# Patient Record
Sex: FEMALE | Race: WHITE | NOT HISPANIC OR LATINO | Employment: FULL TIME
[De-identification: names, ages, dates, MRNs, and addresses within clinical notes are randomized per-mention and may not be internally consistent; named-entity substitution may affect disease eponyms.]

---

## 2017-08-05 ENCOUNTER — HEALTH MAINTENANCE LETTER (OUTPATIENT)
Age: 52
End: 2017-08-05

## 2018-08-12 ENCOUNTER — HEALTH MAINTENANCE LETTER (OUTPATIENT)
Age: 53
End: 2018-08-12

## 2019-12-23 ENCOUNTER — TRANSFERRED RECORDS (OUTPATIENT)
Dept: HEALTH INFORMATION MANAGEMENT | Facility: CLINIC | Age: 54
End: 2019-12-23

## 2019-12-30 ENCOUNTER — MEDICAL CORRESPONDENCE (OUTPATIENT)
Dept: HEALTH INFORMATION MANAGEMENT | Facility: CLINIC | Age: 54
End: 2019-12-30

## 2023-02-14 ENCOUNTER — TRANSCRIBE ORDERS (OUTPATIENT)
Dept: OTHER | Age: 58
End: 2023-02-14

## 2023-02-14 DIAGNOSIS — E34.8 PINEAL GLAND CYST: Primary | ICD-10-CM

## 2023-06-21 NOTE — TELEPHONE ENCOUNTER
Action 6/21/23 MV 1.13p   Action Taken Imaging request faxed to [facility]     Action 7/11/23 MV 1.09pm   Action Taken Images resolved in PACS       RECORDS RECEIVED FROM: external   REASON FOR VISIT: Pineal gland cyst   Date of Appt: 8/23/23   NOTES (FOR ALL VISITS) STATUS DETAILS   OFFICE NOTE from referring provider Care Everywhere Dr Ana Piedra @ [facility]:  2/8/23 telephone encounter   OFFICE NOTE from other specialist Care Everywhere Dr Bruce Restrepo @ [facility]:  1/18/23 11/8/22    Dr Eldon Delgado @ [facility]:  8/19/22    Dr Balta Bruce @ [facility]:  1/5/22    Dr Trenton Seals @ [facility]:  12/23/21  10/27/21  4/15/21  4/6/21   DISCHARGE SUMMARY from hospital Care Everywhere [facility]:  10/28/22-10/30/22   EMG Care Everywhere [facility]:  12/23/21 4/8/21   MEDICATION LIST Care Everywhere    IMAGING  (FOR ALL VISITS)     PET PACS [facility]:  PET CT Brain 12/30/21   MRI (HEAD, NECK, SPINE) PACS [facility]:  MRI Cervical Spine 12/23/21  MRI Brain 12/23/21  MRI Lumbar Spine 12/23/21  MRI Lumbar Spine 4/8/21  MRI Thoracic Spine 4/8/21  MRI Brain 4/7/21  MRI Cervical Spine 4/7/21

## 2023-08-23 ENCOUNTER — OFFICE VISIT (OUTPATIENT)
Dept: NEUROLOGY | Facility: CLINIC | Age: 58
End: 2023-08-23
Attending: FAMILY MEDICINE
Payer: COMMERCIAL

## 2023-08-23 ENCOUNTER — PRE VISIT (OUTPATIENT)
Dept: NEUROLOGY | Facility: CLINIC | Age: 58
End: 2023-08-23

## 2023-08-23 VITALS
OXYGEN SATURATION: 99 % | HEART RATE: 70 BPM | SYSTOLIC BLOOD PRESSURE: 124 MMHG | DIASTOLIC BLOOD PRESSURE: 84 MMHG | RESPIRATION RATE: 16 BRPM

## 2023-08-23 DIAGNOSIS — E34.8 PINEAL GLAND CYST: ICD-10-CM

## 2023-08-23 PROBLEM — M54.12 CERVICAL RADICULOPATHY: Status: ACTIVE | Noted: 2021-05-04

## 2023-08-23 PROCEDURE — 99204 OFFICE O/P NEW MOD 45 MIN: CPT | Mod: GC | Performed by: PSYCHIATRY & NEUROLOGY

## 2023-08-23 RX ORDER — CALCIUM CARBONATE 500(1250)
TABLET ORAL
COMMUNITY
End: 2023-08-25

## 2023-08-23 ASSESSMENT — PAIN SCALES - GENERAL: PAINLEVEL: NO PAIN (0)

## 2023-08-23 NOTE — PROGRESS NOTES
Chief Complaint: Left sided spasticity    History of Present Illness:    Ellen Jones is a 57 year old woman who we are seeing at the request of herself for evaluation of paresthesias and stiffness.     She originally became symptomatic with left foot paresthesias in the spring 2020.  She had gradual increased intensity of the paresthesias for about 1 year, and started to affect her left hand in the spring of 2021.  She started to notice mild stiffness of her left foot and hand at that time as well, most notable during walking and fine motor tasks.  She initially was evaluated at [place] in 2021 for these paresthesias and she got an MRI of brain and C-spine, most notably showing a benign gland cyst at that time.  Since this time, she  describes progression of symptoms that include entire left leg paresthesias and worsened stiffness of the left foot, left leg, left hand, and left upper arm in the last year.  Also in the last year she describes right leg stiffness that did not stand but rather had onset of lower leg.  She now has cramping in the left foot and left hand, usually induced by tasks such as walking or typing.  Walking has become significantly difficult, most notably with stiffness of her legs, but also due to the instability while walking.  She describes mild numbness of the left foot and hand when attempting to grasp objects.  She also profound changes to bradykinesia, most notably in the left hemibody.  She denies changes to chewing, swallowing, dysarthria,    Otherwise, denies changes to chewing, swallowing, shortness of breath, orthopnea, diplopia, dysarthria, speech changes.  She does have mild decrease of voice volume over years.  She has mild left eye photosensitivity and decreased acuity (described as blurriness) in the last 2 to 3 months.  She has noticed worsening of fine motor tasks, especially ones that include dexterity such as typing, buttons, and opening jars.  She has stopped driving  because she has poor reaction time and feels uncoordinated and stiff in the left arm and cannot turn very well.  She is much slower with all of her ADLs, and occasionally needs help with them in the last few months.  She denies lion myoclonus, although does describe occasional jerkiness of her left hand.  She denies inadvertent movements of her left hand or foot.    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  She did not go through with this because she was concerned that the pineal gland cyst may be contributing to her symptoms.  FDG PET was also obtained at [place], and do not definitively show any asymmetry or lack of uptake in the cortices.  She found a provider with specific interest in pineal gland cysts in [place], and recently went down there to have this cyst drained, which was successfully performed, but unfortunately she did not get any resolution of her symptoms.  She has since had MRI of the brain and C-spine performed locally through a separate radiology department, just performed in April, but these images are on a CD and will be uploaded to PACS shortly, not currently available for review.    Prior pertinent radiology work-up:  Most recent imaging available in our system is December 2021, which was performed at [place] and include MRI brain, cervical spine, and PET scan of the brain.  MRI brain only notable for benign pineal gland cyst, otherwise no acute findings to explain symptoms in the brain or cervical spine.    Prior electrophysiologic work-up:  Nerve conduction studies/EMG: Nerve conduction studies and EMG performed at [place] in 2021 which  showed chronic left L5 radiculopathy without any active denervation and unlikely contributing to symptoms.    Past Medical History:   Benign pineal gland cyst s/p drainage in [place]    Past Surgical  History:  Past Surgical History:   Procedure Laterality Date    [facility] NONSPECIFIC PROCEDURE  July 2003    hysteroscopy with resection of submucous myoma, Dr. Hugo     Family history:    There is no known family history of hereditary neuropathies or other neuromuscular disorders.    Social History:    She denies tobacco, alcohol, or illicit drug use.     Medical Allergies:  No Known Allergies     Current Medications:    Current Outpatient Medications   Medication    MULTIVITAMIN TABS   OR    calcium carbonate (OS-SCOOBY) 500 MG tablet    CALCIUM PO    norgestim-eth estrad triphasic (ORTHO TRI-CYCLEN LO) 0.18/0.215/0.25 MG-25 MCG TABS     No current facility-administered medications for this visit.     Review of Systems: A complete review of systems was obtained and was negative except for what was noted above.    Physical examination:    /84   Pulse 70   Resp 16   LMP 06/03/2015 (Exact Date)   SpO2 99%      General Appearance: NAD    Skin: There are no rashes or other skin lesions.    Musculoskeletal:  There is no scoliosis, lordosis, kyphosis, pes cavus, or hammertoes.    Neurologic examination:    Mental status:  Patient is alert, attentive.  Language is coherent and fluent without aphasia.  Memory, comprehension and ability to follow commands were intact.       Cranial nerves:   Pupils were round and reacted to light.  Extraocular movements were full. There was no face, jaw, palate or tongue weakness or atrophy. Hearing was grossly intact.  Shoulder shrug some lag in the left side.  Voice was hypophonic, no dysarthria.  Decreased blink rate.     Motor exam: The is increased tone and cogwheeling in the left arm and leg. No atrophy or fasciculations.   Manual muscle testing revealed the following MRC grade muscle power:   Right Left   Neck flexion 5    Neck extension: 5    Shoulder abduction:  5 5   Elbow extension: 5 5   Elbow flexion:  5 5   Wrist flexion:  5 5   Wrist flexion: 5 5   Finger extension:  5 5    FDI 5 5   Hip extension 5 5   Knee flexion 5 5   Knee extension 5 5   Dorsiflexion 5 5   Plantar flexion 5 5     Complex motor skills: Finger-nose-finger intact without ataxia, but did show slowing of the left arm during testing.  Heel-to-shin slow bilaterally, but accurate slide.  Rolling of the hand showed satelliting of the right arm around the left.  But alternating movements were slow on the left finger tapping and left toe tapping.    Sensory exam revealed normal vibratory perception in the toes bilaterally. Proprioception was intact. Pinprick hyperesthesia of the right foot, and left side from toes up to mid shin, otherwise normal in the fingertips.  Light touch was normal.  Two-point discrimination was abnormal in the left hand.  Romberg sign was absent.       Gait: Able to rise from chair unassisted.  Gait showed significant rigidity of the left hemibody, minimal bend in the left knee, almost absent arm swing in the left arm, short stride, 5 steps for turn.  Retropulsion test was positive.     Deep tendon reflexes:   Right Left   Triceps 2 2   Biceps 3 3   Brachioradialis 3 3   Knee jerk 3 3   Ankle jerk 2 2   Plantar responses were flexor bilaterally.  There was some spread biceps and brachioradialis to finger flexion.  Luigi negative.  Knee jerk and abductors about the knee showed 3-4 beats of clonus in the knees.  No clonus about the ankle.     Assessment:    Ellen Jones is a 57 year old woman with progressive asymmetric stiffness and spasticity. She is Parkinsonian on examination. As part of her [place] work up an incidental benign pineal gland cyst was found. This was drained in [place], which has not changed.  She has marked asymmetric bradykinesia, rigidity, and subjective paresthesias most prominent in the left hemibody, with some involvement of the right leg.  Due to possible agraphesthesia on exam, this may be attributable to cortical sensory dysfunction.  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  Based on relatively recent nerve conduction studies performed at [place] during which time she was having the notable paresthesias, only showed chronic left L5 radiculopathy without any active denervation changes and unlikely contributing to her syndrome.  She does not have any active neuromuscular problem.  Strongly recommend following up with movement disorder specialist. We will help facilitate this referral. Appreciate their expertise for further work up and management.     Plan:      - Referral to movement disorder team for further work up and management   - No further neuromuscular work up indicated    Patient seen and discussed with attending Dr. Peidra. His addendum follows.     Eliceo Enrique MD  Neuromuscular Medicine Fellow, PGY-5  [facility]  ---    ADDENDUM:  I personally interviewed and examined the patient. I agree with the history, physical, assessment, and plan as documented above. Changes to the physical examination, assessment and plan have been incorporated into the note by myself, as to make it a single cohesive document.    Kermit Piedra MD

## 2023-08-23 NOTE — LETTER
8/23/2023       RE: Ellen Jones  [address] [address]     Dear Colleague,    Thank you for referring your patient, Ellen Jones, to the [facility] [place] at [facility]. Please see a copy of my visit note below.    Chief Complaint: Left sided spasticity    History of Present Illness:    Ellen Jones is a 57 year old woman who we are seeing at the request of herself for evaluation of paresthesias and stiffness.     She originally became symptomatic with left foot paresthesias in the spring 2020.  She had gradual increased intensity of the paresthesias for about 1 year, and started to affect her left hand in the spring of 2021.  She started to notice mild stiffness of her left foot and hand at that time as well, most notable during walking and fine motor tasks.  She initially was evaluated at [place] in 2021 for these paresthesias and she got an MRI of brain and C-spine, most notably showing a benign gland cyst at that time.  Since this time, she  describes progression of symptoms that include entire left leg paresthesias and worsened stiffness of the left foot, left leg, left hand, and left upper arm in the last year.  Also in the last year she describes right leg stiffness that did not stand but rather had onset of lower leg.  She now has cramping in the left foot and left hand, usually induced by tasks such as walking or typing.  Walking has become significantly difficult, most notably with stiffness of her legs, but also due to the instability while walking.  She describes mild numbness of the left foot and hand when attempting to grasp objects.  She also profound changes to bradykinesia, most notably in the left hemibody.  She denies changes to chewing, swallowing, dysarthria,    Otherwise, denies changes to chewing, swallowing, shortness of breath, orthopnea, diplopia, dysarthria, speech changes.  She does  have mild decrease of voice volume over years.  She has mild left eye photosensitivity and decreased acuity (described as blurriness) in the last 2 to 3 months.  She has noticed worsening of fine motor tasks, especially ones that include dexterity such as typing, buttons, and opening jars.  She has stopped driving because she has poor reaction time and feels uncoordinated and stiff in the left arm and cannot turn very well.  She is much slower with all of her ADLs, and occasionally needs help with them in the last few months.  She denies lion myoclonus, although does describe occasional jerkiness of her left hand.  She denies inadvertent movements of her left hand or foot.    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  She did not go through with this because she was concerned that the pineal gland cyst may be contributing to her symptoms.  FDG PET was also obtained at [place], and do not definitively show any asymmetry or lack of uptake in the cortices.  She found a provider with specific interest in pineal gland cysts in [place], and recently went down there to have this cyst drained, which was successfully performed, but unfortunately she did not get any resolution of her symptoms.  She has since had MRI of the brain and C-spine performed locally through a separate radiology department, just performed in April, but these images are on a CD and will be uploaded to PACS shortly, not currently available for review.    Prior pertinent radiology work-up:  Most recent imaging available in our system is December 2021, which was performed at [place] and include MRI brain, cervical spine, and PET scan of the brain.  MRI brain only notable for benign pineal gland cyst, otherwise no acute findings to explain symptoms in the brain or cervical spine.    Prior electrophysiologic  work-up:  Nerve conduction studies/EMG: Nerve conduction studies and EMG performed at [place] in 2021 which  showed chronic left L5 radiculopathy without any active denervation and unlikely contributing to symptoms.    Past Medical History:   Benign pineal gland cyst s/p drainage in [place]    Past Surgical History:  Past Surgical History:   Procedure Laterality Date    [facility] NONSPECIFIC PROCEDURE  July 2003    hysteroscopy with resection of submucous myoma, Dr. Hugo     Family history:    There is no known family history of hereditary neuropathies or other neuromuscular disorders.    Social History:    She denies tobacco, alcohol, or illicit drug use.     Medical Allergies:  No Known Allergies     Current Medications:    Current Outpatient Medications   Medication    MULTIVITAMIN TABS   OR    calcium carbonate (OS-SCOOBY) 500 MG tablet    CALCIUM PO    norgestim-eth estrad triphasic (ORTHO TRI-CYCLEN LO) 0.18/0.215/0.25 MG-25 MCG TABS     No current facility-administered medications for this visit.     Review of Systems: A complete review of systems was obtained and was negative except for what was noted above.    Physical examination:    /84   Pulse 70   Resp 16   LMP 06/03/2015 (Exact Date)   SpO2 99%      General Appearance: NAD    Skin: There are no rashes or other skin lesions.    Musculoskeletal:  There is no scoliosis, lordosis, kyphosis, pes cavus, or hammertoes.    Neurologic examination:    Mental status:  Patient is alert, attentive.  Language is coherent and fluent without aphasia.  Memory, comprehension and ability to follow commands were intact.       Cranial nerves:   Pupils were round and reacted to light.  Extraocular movements were full. There was no face, jaw, palate or tongue weakness or atrophy. Hearing was grossly intact.  Shoulder shrug some lag in the left side.  Voice was hypophonic, no dysarthria.  Decreased blink rate.     Motor exam: The is increased tone and cogwheeling in the  left arm and leg. No atrophy or fasciculations.   Manual muscle testing revealed the following MRC grade muscle power:   Right Left   Neck flexion 5    Neck extension: 5    Shoulder abduction:  5 5   Elbow extension: 5 5   Elbow flexion:  5 5   Wrist flexion:  5 5   Wrist flexion: 5 5   Finger extension:  5 5   FDI 5 5   Hip extension 5 5   Knee flexion 5 5   Knee extension 5 5   Dorsiflexion 5 5   Plantar flexion 5 5     Complex motor skills: Finger-nose-finger intact without ataxia, but did show slowing of the left arm during testing.  Heel-to-shin slow bilaterally, but accurate slide.  Rolling of the hand showed satelliting of the right arm around the left.  But alternating movements were slow on the left finger tapping and left toe tapping.    Sensory exam revealed normal vibratory perception in the toes bilaterally. Proprioception was intact. Pinprick hyperesthesia of the right foot, and left side from toes up to mid shin, otherwise normal in the fingertips.  Light touch was normal.  Two-point discrimination was abnormal in the left hand.  Romberg sign was absent.       Gait: Able to rise from chair unassisted.  Gait showed significant rigidity of the left hemibody, minimal bend in the left knee, almost absent arm swing in the left arm, short stride, 5 steps for turn.  Retropulsion test was positive.     Deep tendon reflexes:   Right Left   Triceps 2 2   Biceps 3 3   Brachioradialis 3 3   Knee jerk 3 3   Ankle jerk 2 2   Plantar responses were flexor bilaterally.  There was some spread biceps and brachioradialis to finger flexion.  Luigi negative.  Knee jerk and abductors about the knee showed 3-4 beats of clonus in the knees.  No clonus about the ankle.     Assessment:    Ellen Jones is a 57 year old woman with progressive asymmetric stiffness and spasticity. She is Parkinsonian on examination. As part of her [place] work up an incidental benign pineal gland cyst was found. This was drained in [facility]  [place], which has not changed.  She has marked asymmetric bradykinesia, rigidity, and subjective paresthesias most prominent in the left hemibody, with some involvement of the right leg.  Due to possible agraphesthesia on exam, this may be attributable to cortical sensory dysfunction.  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  Based on relatively recent nerve conduction studies performed at [place] during which time she was having the notable paresthesias, only showed chronic left L5 radiculopathy without any active denervation changes and unlikely contributing to her syndrome.  She does not have any active neuromuscular problem.  Strongly recommend following up with movement disorder specialist. We will help facilitate this referral. Appreciate their expertise for further work up and management.     Plan:      - Referral to movement disorder team for further work up and management   - No further neuromuscular work up indicated    Patient seen and discussed with attending Dr. Piedra. His addendum follows.     Eliceo Enrique MD  Neuromuscular Medicine Fellow, PGY-5  [facility]  ---    ADDENDUM:  I personally interviewed and examined the patient. I agree with the history, physical, assessment, and plan as documented above. Changes to the physical examination, assessment and plan have been incorporated into the note by myself, as to make it a single cohesive document.           Again, thank you for allowing me to participate in the care of your patient.      Sincerely,    Kermit Piedra MD

## 2023-08-24 ENCOUNTER — TELEPHONE (OUTPATIENT)
Dept: NEUROLOGY | Facility: CLINIC | Age: 58
End: 2023-08-24
Payer: COMMERCIAL

## 2023-08-24 NOTE — TELEPHONE ENCOUNTER
Return patient call to rescheduled at different location per patient request.  Patient stated she will keep her [place] location appointment with Dr. Blevins.      Ella Graham on 8/24/2023 at 8:35 AM

## 2023-08-25 ENCOUNTER — TELEPHONE (OUTPATIENT)
Dept: NEUROLOGY | Facility: CLINIC | Age: 58
End: 2023-08-25

## 2023-08-25 ENCOUNTER — OFFICE VISIT (OUTPATIENT)
Dept: NEUROLOGY | Facility: CLINIC | Age: 58
End: 2023-08-25
Payer: COMMERCIAL

## 2023-08-25 VITALS — SYSTOLIC BLOOD PRESSURE: 119 MMHG | DIASTOLIC BLOOD PRESSURE: 80 MMHG | HEART RATE: 71 BPM

## 2023-08-25 DIAGNOSIS — G20.C PARKINSONISM, UNSPECIFIED PARKINSONISM TYPE (H): Primary | ICD-10-CM

## 2023-08-25 PROCEDURE — 99417 PROLNG OP E/M EACH 15 MIN: CPT | Performed by: PSYCHIATRY & NEUROLOGY

## 2023-08-25 PROCEDURE — 99205 OFFICE O/P NEW HI 60 MIN: CPT | Performed by: PSYCHIATRY & NEUROLOGY

## 2023-08-25 RX ORDER — CARBIDOPA AND LEVODOPA 25; 100 MG/1; MG/1
1 TABLET ORAL 3 TIMES DAILY
Qty: 90 TABLET | Refills: 11 | Status: SHIPPED | OUTPATIENT
Start: 2023-08-25 | End: 2023-11-06

## 2023-08-25 ASSESSMENT — UNIFIED PARKINSONS DISEASE RATING SCALE (UPDRS)
FACIAL_EXPRESSION: (2) MILD: IN ADDITION TO DECREASED EYE-BLINK FREQUENCY, MASKED FACIES PRESENT IN THE LOWER FACE AS WELL, NAMELY FEWER MOVEMENTS AROUND THE MOUTH, SUCH AS LESS SPONTANEOUS SMILING, BUT LIPS NOT PARTED.
TOTAL_SCORE_LEFT: 14
GAIT: (1) SLIGHT: INDEPENDENT WALKING WITH MINOR GAIT IMPAIRMENT.
PRONATION_SUPINATION_LEFT: (2) MILD: ANY OF THE FOLLOWING: A) 3 TO 5 INTERRUPTIONS DURING TAPPING  B) MILD SLOWING  C) THE AMPLITUDE DECREMENTS MIDWAY IN THE 10-MOVEMENT SEQUENCE.
AMPLITUDE_LIP_JAW: (0) NORMAL: NO TREMOR.
CONSTANCY_TREMOR_ATREST: (0) NORMAL: NO TREMOR.
HANDMOVEMENTS_LEFT: (2) MILD: ANY OF THE FOLLOWING: A) 3 TO 5 INTERRUPTIONS DURING TAPPING  B) MILD SLOWING  C) THE AMPLITUDE DECREMENTS MIDWAY IN THE 10-MOVEMENT SEQUENCE.
RIGIDITY_NECK: (1) SLIGHT: RIGIDITY ONLY DETECTED WITH ACTIVATION MANEUVER.
TOTAL_SCORE: 21
RIGIDITY_LLE: (2) MILD: RIGIDITY DETECTED WITHOUT THE ACTIVATION MANEUVER. FULL RANGE OF MOTION IS EASILY ACHIEVED.
LEG_AGILITY_LEFT: (1) SLIGHT: ANY OF THE FOLLOWING: A) THE REGULAR RHYTHM IS BROKEN WITH ONE WITH ONE OR TWO INTERRUPTIONS OR HESITATIONS OF THE MOVEMENT  B) SLIGHT SLOWING  C) THE AMPLITUDE DECREMENTS NEAR THE END OF THE 10 MOVEMENTS.
AMPLITUDE_LLE: (0) NORMAL: NO TREMOR.
POSTURE: (0) NORMAL: NO PROBLEMS.
FINGER_TAPPING_RIGHT: (0) NORMAL: NO PROBLEMS.
FINGER_TAPPING_LEFT: (2) MILD: ANY OF THE FOLLOWING: A) 3 TO 5 INTERRUPTIONS DURING TAPPING  B) MILD SLOWING  C) THE AMPLITUDE DECREMENTS MIDWAY IN THE 10-MOVEMENT SEQUENCE.
RIGIDITY_RUE: (0) NORMAL: NO RIGIDITY.
SPONTANEITY_OF_MOVEMENT: (2) MILD: MILD GLOBAL SLOWNESS AND POVERTY OF SPONTANEOUS MOVEMENTS.
PRONATION_SUPINATION_RIGHT: (0) NORMAL: NO PROBLEMS.
FREEZING_GAIT: (0) NORMAL: NO FREEZING.
SPEECH: (1) SLIGHT: LOSS OF MODULATION, DICTION OR VOLUME, BUT STILL ALL WORDS EASY TO UNDERSTAND.
LEG_AGILITY_RIGHT: (0) NORMAL: NO PROBLEMS.
ARISING_CHAIR: (0) NORMAL: NO PROBLEMS. ABLE TO ARISE QUICKLY WITHOUT HESITATION.
AMPLITUDE_LUE: (0) NORMAL: NO TREMOR.
AMPLITUDE_RLE: (0) NORMAL: NO TREMOR.
DYSKINESIAS_PRESENT: NO
TOETAPPING_RIGHT: (0) NORMAL: NO PROBLEMS.
RIGIDITY_LUE: (2) MILD: RIGIDITY DETECTED WITHOUT THE ACTIVATION MANEUVER. FULL RANGE OF MOTION IS EASILY ACHIEVED.
POSTURAL_STABILITY: (0) NORMAL: NO PROBLEMS. RECOVERS WITH ONE OR TWO STEPS.
TOETAPPING_LEFT: (2) MILD: ANY OF THE FOLLOWING: A) 3 TO 5 INTERRUPTIONS DURING TAPPING  B) MILD SLOWING  C) THE AMPLITUDE DECREMENTS MIDWAY IN THE 10-MOVEMENT SEQUENCE.
AXIAL_SCORE: 7
RIGIDITY_RLE: (0) NORMAL: NO RIGIDITY.
PARKINSONS_MEDS: OFF
TOTAL_SCORE: 0
HANDMOVEMENTS_RIGHT: (0) NORMAL: NO PROBLEMS.
AMPLITUDE_RUE: (0) NORMAL: NO TREMOR.

## 2023-08-25 NOTE — PROGRESS NOTES
"Department of Neurology  Movement Disorders Division   New Patient Visit    Patient: Ellen Jones   MRN: [id number]   : 1965   Date of Visit: 2023  PCP: Pat Gandhi MD   Referring provider: No ref. provider found    CC:  Spasticity & paresthesias    HPI:  Ms. Jones is a 57 year old right-handed female with no significant PMH who presents to movement disorder clinic for parkinsonism. She is accompanied by her sister, Angelina.    She reports symptoms started with numbness/tingling in left foot in . She had an EMG and was told it was nerve related. She was then seen at [facility] and \"ruled out radiculopathy\". In 2021, she was told potentially parkinsonian. 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 Has moved up to left leg and left hand. Now stiffness and weakness. She has difficulty with washing/blow drying hair. She has difficulty with typing. She denies alien limb phenomenon.    She has not tried any medications or procedures    Parkinson's Disease Motor Symptom Review:    Motor fluctuations:     Dyskinesia: denies  Wearing off:  n/a.  Freezing of gait: denies  Dystonia: see above  Tremor: left arm, not so much with resting with moving  Rigidity: see above  Bradykinesia: see above     Parkinson's Disease Non-motor Symptom Review:    Psychiatric disturbances - naturally worried about symptoms but denies anxiety, depression or emotional incontinence  Hallucinations - denies.  Cognitive impairment -  no significant changes but \"everything seems slower\". Continues to work, which is increasingly difficult but able to manage.  Sleep disturbances - \"like a rock\". Gets up to use the restroom but able to fall asleep easily. Maybe rolls in bed at night but not sure if she    GI symptoms - denies constipation.  Urinary symptoms - urinary frequency and occasional incontinence when can't get to " bathroom fast enough.   Balance - no falls. Does not used assistive device. Worked with physical therapy a little bit go, not sure it helped.  Pain - when walking left toe will cramp up which is pain, some cramping in the fingers as well but not pain per se.  Autonomic dysfunction - denies lightheadedness with standing  Speech - denies.  Swallowing - denies.  Salivation - denies but has been waking up with mouth open    Dopamine agonist side effects - n/a  Driving: not since 2021 due to symptoms, has difficulty with holding on to steer and stay in [place]  Living situation: lives with sister in a house with stairs, no difficulty with stairs  Medication compliance independent only taking vitamins.  ADL's: independent.     MEDICATIONS reviewed & pertinent for:  None    ROS:  All others negative except as listed above.    Past Medical History:   Diagnosis Date    NO ACTIVE PROBLEMS         Past Surgical History:   Procedure Laterality Date    [facility] NONSPECIFIC PROCEDURE  July 2003    hysteroscopy with resection of submucous myoma, Dr. Hugo          Current Outpatient Medications:     CALCIUM PO, Take 1 tablet by mouth daily, Disp: , Rfl:     MULTIVITAMIN TABS   OR, 1 TABLET DAILY, Disp: , Rfl:      No Known Allergies     Family History   Problem Relation Age of Onset    C.A.D. Father     Respiratory Sister         Asthma    Diabetes Father     Breast Cancer Other         Great Grandmother- maternal side    Cancer - colorectal Maternal Grandfather     Cancer Father         esophageal    Osteoporosis Maternal Grandmother     Heart Disease Father         MI        Social History:  She  reports that she has never smoked. She has never used smokeless tobacco. She reports that she does not drink alcohol and does not use drugs.     PHYSICAL EXAM:  /80 (BP Location: Right arm, Patient Position: Sitting)   Pulse 71   LMP 06/03/2015 (Exact Date)      Gen: alert, active, attentive, appropriately groomed     NEURO:  MS: Alert  and oriented to person, place, time, and situation.  Speech normal to comprehension.  Recent and remote memory intact.  Attention and concentration normal.  Fund of knowledge normal. I did not appreciate any consistent agraphesthesia in either hand, nor any apraxia and stereoagnosis was intact.    CN:  Pupils equal, round, and reactive to light. VFF. EOM normal range, no nystagmus.  Normal saccades. Facial sensation intact. Face symmetric at rest and with activation. Hearing grossly intact to conversation. No dysarthria, slight hypophonia. Shoulder shrug with lag on the left. Tongue protrudes midline. No fasciculation or atrophy noted.    Motor:  see UPDRS. Strength is 5/5 throughout and symmetric.    Reflexes: 2+ in L biceps, brachioradialis, and patellar compared to 3+ on the right. Downgoing plantar reflexes bilaterally.    Sensation:  Intact to LT, vibration, and temperature in all extremities.    Coordination:  FTN and HTS without dysmetria bilaterally.    Gait:  short stride length with spastic gait favoring left leg, minimal armswing bilaterally. Retropulsion intact.        8/25/2023     9:00 AM   UPDRS Motor Scale   Time: 09:17   Medication Off   R Brain DBS: None   L Brain DBS: None   Dyskinesia (LID) No   Speech 1   Facial Expression 2   Rigidity Neck 1   Rigidity RUE 0   Rigidity LUE 2   Rigidity RLE 0   Rigidity LLE 2   Finger Taps R 0   Finger Taps L 2   Hand Mvt R 0   Hand Mvt L 2   Pron-/Supinate R 0   Pron-/Supinate L 2   Toe Tap R 0   Toe Tap L 2   Leg Agility R 0   Leg Agility L 1   Postural Tremor RUE 0   Postural Tremor LUE 0   Kinetic Tremor RUE 0   Kinetic Tremor LUE 1   Rest Tremor RUE 0   Rest Tremor LUE 0   Rest Tremor RLE 0   Rest Tremor LLE 0   Rest Tremor Lip/Jaw 0   Rest Tremor Constancy 0   Total Right 0   Total Left 14        DATA REVIEWED:  MRI brain with and without cont 12/23/21 - personally reviewed & notable for potentially slight atrophy in right parietal lobe compared to left  but not significant.  Impression  1. No findings for demyelinating lesions in the brain or cervical spinal cord.  2. Stable pineal cyst.    PET CT brain 12/30/21  Impression    Nonspecific slight patchy parietal lobe hypometabolism. Please see  findings for details and other observations.    I also personally reviewed an MRI from April 2023 that she brought on a CD and I did not appreciate asymmetric atrophy in this image. We will plan to upload to our system.    MRI C spine 12/23/21  Impression  1. No findings for demyelinating lesions in the brain or cervical spinal cord.  2. Stable pineal cyst.    MRI L spine 12/23/21  Impression  1. Stable MRI of the lumbar spine compared to 04/08/2021.  2. Degenerative changes most marked at the L3-L5 levels as detailed in the  findings, resulting in mild lateral recess stenosis bilaterally at L4-L5. No  significant spinal canal stenosis or neural foraminal narrowing.    ASSESSMENT:  Ms. Jones is a 57 year old right-handed female with no significant PMH who presents to movement disorder clinic for parkinsonism. Exam today is parkinsonian with asymmetric left sided rigidity, bradykinesia. I did not appreciate any cortical signs today. Differential diagnosis includes an akinetic rigid Parkinson's disease but could also consider an atypical parkinsonism such as CBD. However, she has not yet undergone a levodopa trial and this may assist with diagnosis as well as improve quality of life for her. Imaging without any pathognomic signs that I am able to appreciate. Diagnosis and plan was discussed with her and sister and we will plan for a short follow-up to evaluate for levodopa responsiveness.    PLAN:  - start carbidopa/levodopa 25/100 mg TID  - encouraged regular physical activity    RTC 2 months, transition to [facility]    Romi Blevins MD   of Neurology  [facility]  Department of Neurology       120 minutes spent on the date of the  encounter doing chart review, history and exam, documentation and further activities as noted above

## 2023-08-25 NOTE — PATIENT INSTRUCTIONS
We will start a medication called carbidopa/levodopa. Take 1 tab 3 times per day.     If you have any questions/concerns, please don't hesitate to reach out.    We will have you follow-up in [place].

## 2023-08-25 NOTE — NURSING NOTE
Chief Complaint   Patient presents with    Consult     Neurological symptoms  Referred by Kermit Piedra MD Kena Gemeda, MA on 8/25/2023 at 8:56 AM

## 2023-08-25 NOTE — LETTER
"    2023         RE: Ellen Jones  [address] [address]        Dear Colleague,    Thank you for referring your patient, Ellen Jones, to the [facility]. Please see a copy of my visit note below.    Department of Neurology  Movement Disorders Division   New Patient Visit    Patient: Ellen Jones   MRN: [id number]   : 1965   Date of Visit: 2023  PCP: Pat Gandhi MD   Referring provider: No ref. provider found    CC:  Spasticity & paresthesias    HPI:  Ms. Jones is a 57 year old right-handed female with no significant PMH who presents to movement disorder clinic for parkinsonism. She is accompanied by her sister, Angelina.    She reports symptoms started with numbness/tingling in left foot in . She had an EMG and was told it was nerve related. She was then seen at [facility] and \"ruled out radiculopathy\". In 2021, she was told potentially parkinsonian. 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 Has moved up to left leg and left hand. Now stiffness and weakness. She has difficulty with washing/blow drying hair. She has difficulty with typing. She denies alien limb phenomenon.    She has not tried any medications or procedures    Parkinson's Disease Motor Symptom Review:    Motor fluctuations:     Dyskinesia: denies  Wearing off:  n/a.  Freezing of gait: denies  Dystonia: see above  Tremor: left arm, not so much with resting with moving  Rigidity: see above  Bradykinesia: see above     Parkinson's Disease Non-motor Symptom Review:    Psychiatric disturbances - naturally worried about symptoms but denies anxiety, depression or emotional incontinence  Hallucinations - denies.  Cognitive impairment -  no significant changes but \"everything seems slower\". Continues to work, which is increasingly difficult but able to " "manage.  Sleep disturbances - \"like a rock\". Gets up to use the restroom but able to fall asleep easily. Maybe rolls in bed at night but not sure if she    GI symptoms - denies constipation.  Urinary symptoms - urinary frequency and occasional incontinence when can't get to bathroom fast enough.   Balance - no falls. Does not used assistive device. Worked with physical therapy a little bit go, not sure it helped.  Pain - when walking left toe will cramp up which is pain, some cramping in the fingers as well but not pain per se.  Autonomic dysfunction - denies lightheadedness with standing  Speech - denies.  Swallowing - denies.  Salivation - denies but has been waking up with mouth open    Dopamine agonist side effects - n/a  Driving: not since 2021 due to symptoms, has difficulty with holding on to steer and stay in [place]  Living situation: lives with sister in a house with stairs, no difficulty with stairs  Medication compliance independent only taking vitamins.  ADL's: independent.     MEDICATIONS reviewed & pertinent for:  None    ROS:  All others negative except as listed above.    Past Medical History:   Diagnosis Date     NO ACTIVE PROBLEMS         Past Surgical History:   Procedure Laterality Date     [facility] NONSPECIFIC PROCEDURE  July 2003    hysteroscopy with resection of submucous myoma, Dr. Hugo          Current Outpatient Medications:      CALCIUM PO, Take 1 tablet by mouth daily, Disp: , Rfl:      MULTIVITAMIN TABS   OR, 1 TABLET DAILY, Disp: , Rfl:      No Known Allergies     Family History   Problem Relation Age of Onset     C.A.D. Father      Respiratory Sister         Asthma     Diabetes Father      Breast Cancer Other         Great Grandmother- maternal side     Cancer - colorectal Maternal Grandfather      Cancer Father         esophageal     Osteoporosis Maternal Grandmother      Heart Disease Father         MI        Social History:  She  reports that she has never smoked. She has never used " smokeless tobacco. She reports that she does not drink alcohol and does not use drugs.     PHYSICAL EXAM:  /80 (BP Location: Right arm, Patient Position: Sitting)   Pulse 71   LMP 06/03/2015 (Exact Date)      Gen: alert, active, attentive, appropriately groomed     NEURO:  MS: Alert and oriented to person, place, time, and situation.  Speech normal to comprehension.  Recent and remote memory intact.  Attention and concentration normal.  Fund of knowledge normal. I did not appreciate any consistent agraphesthesia in either hand, nor any apraxia and stereoagnosis was intact.    CN:  Pupils equal, round, and reactive to light. VFF. EOM normal range, no nystagmus.  Normal saccades. Facial sensation intact. Face symmetric at rest and with activation. Hearing grossly intact to conversation. No dysarthria, slight hypophonia. Shoulder shrug with lag on the left. Tongue protrudes midline. No fasciculation or atrophy noted.    Motor:  see UPDRS. Strength is 5/5 throughout and symmetric.    Reflexes: 2+ in L biceps, brachioradialis, and patellar compared to 3+ on the right. Downgoing plantar reflexes bilaterally.    Sensation:  Intact to LT, vibration, and temperature in all extremities.    Coordination:  FTN and HTS without dysmetria bilaterally.    Gait:  short stride length with spastic gait favoring left leg, minimal armswing bilaterally. Retropulsion intact.        8/25/2023     9:00 AM   UPDRS Motor Scale   Time: 09:17   Medication Off   R Brain DBS: None   L Brain DBS: None   Dyskinesia (LID) No   Speech 1   Facial Expression 2   Rigidity Neck 1   Rigidity RUE 0   Rigidity LUE 2   Rigidity RLE 0   Rigidity LLE 2   Finger Taps R 0   Finger Taps L 2   Hand Mvt R 0   Hand Mvt L 2   Pron-/Supinate R 0   Pron-/Supinate L 2   Toe Tap R 0   Toe Tap L 2   Leg Agility R 0   Leg Agility L 1   Postural Tremor RUE 0   Postural Tremor LUE 0   Kinetic Tremor RUE 0   Kinetic Tremor LUE 1   Rest Tremor RUE 0   Rest Tremor LUE 0    Rest Tremor RLE 0   Rest Tremor LLE 0   Rest Tremor Lip/Jaw 0   Rest Tremor Constancy 0   Total Right 0   Total Left 14        DATA REVIEWED:  MRI brain with and without cont 12/23/21 - personally reviewed & notable for potentially slight atrophy in right parietal lobe compared to left but not significant.  Impression  1. No findings for demyelinating lesions in the brain or cervical spinal cord.  2. Stable pineal cyst.    PET CT brain 12/30/21  Impression    Nonspecific slight patchy parietal lobe hypometabolism. Please see  findings for details and other observations.    I also personally reviewed an MRI from April 2023 that she brought on a CD and I did not appreciate asymmetric atrophy in this image. We will plan to upload to our system.    MRI C spine 12/23/21  Impression  1. No findings for demyelinating lesions in the brain or cervical spinal cord.  2. Stable pineal cyst.    MRI L spine 12/23/21  Impression  1. Stable MRI of the lumbar spine compared to 04/08/2021.  2. Degenerative changes most marked at the L3-L5 levels as detailed in the  findings, resulting in mild lateral recess stenosis bilaterally at L4-L5. No  significant spinal canal stenosis or neural foraminal narrowing.    ASSESSMENT:  Ms. Jones is a 57 year old right-handed female with no significant PMH who presents to movement disorder clinic for parkinsonism. Exam today is parkinsonian with asymmetric left sided rigidity, bradykinesia. I did not appreciate any cortical signs today. Differential diagnosis includes an akinetic rigid Parkinson's disease but could also consider an atypical parkinsonism such as CBD. However, she has not yet undergone a levodopa trial and this may assist with diagnosis as well as improve quality of life for her. Imaging without any pathognomic signs that I am able to appreciate. Diagnosis and plan was discussed with her and sister and we will plan for a short follow-up to evaluate for levodopa  responsiveness.    PLAN:  - start carbidopa/levodopa 25/100 mg TID  - encouraged regular physical activity    RTC 2 months, transition to [facility]    Romi Blevins MD   of Neurology  [facility]  Department of Neurology       120 minutes spent on the date of the encounter doing chart review, history and exam, documentation and further activities as noted above      Again, thank you for allowing me to participate in the care of your patient.        Sincerely,        Romi Blevins MD

## 2023-08-25 NOTE — TELEPHONE ENCOUNTER
[facility]-needs to schedule 2 months follow up (around 11/6) for 30 min with Dr. Blevins in [facility] per staff message.

## 2023-09-16 ENCOUNTER — HEALTH MAINTENANCE LETTER (OUTPATIENT)
Age: 58
End: 2023-09-16

## 2023-10-12 ENCOUNTER — TELEPHONE (OUTPATIENT)
Dept: NEUROLOGY | Facility: CLINIC | Age: 58
End: 2023-10-12
Payer: COMMERCIAL

## 2023-10-12 NOTE — TELEPHONE ENCOUNTER
Left message about the change in time for the appointment with dr. Felicity lopez 11-6-23 due to an error in the scheduling template.

## 2023-11-06 ENCOUNTER — OFFICE VISIT (OUTPATIENT)
Dept: NEUROLOGY | Facility: CLINIC | Age: 58
End: 2023-11-06
Payer: COMMERCIAL

## 2023-11-06 VITALS — DIASTOLIC BLOOD PRESSURE: 79 MMHG | SYSTOLIC BLOOD PRESSURE: 117 MMHG | OXYGEN SATURATION: 99 % | HEART RATE: 69 BPM

## 2023-11-06 DIAGNOSIS — G20.A1 PARKINSON'S DISEASE WITHOUT DYSKINESIA OR FLUCTUATING MANIFESTATIONS (H): Primary | ICD-10-CM

## 2023-11-06 DIAGNOSIS — G20.C PARKINSONISM, UNSPECIFIED PARKINSONISM TYPE (H): ICD-10-CM

## 2023-11-06 PROCEDURE — 99214 OFFICE O/P EST MOD 30 MIN: CPT | Performed by: PSYCHIATRY & NEUROLOGY

## 2023-11-06 RX ORDER — CARBIDOPA AND LEVODOPA 25; 100 MG/1; MG/1
1 TABLET ORAL 3 TIMES DAILY
Qty: 270 TABLET | Refills: 3 | Status: SHIPPED | OUTPATIENT
Start: 2023-11-06

## 2023-11-06 ASSESSMENT — UNIFIED PARKINSONS DISEASE RATING SCALE (UPDRS)
POSTURE: (0) NORMAL: NO PROBLEMS.
RIGIDITY_LUE: (1) SLIGHT: RIGIDITY ONLY DETECTED WITH ACTIVATION MANEUVER.
TOETAPPING_LEFT: (1) SLIGHT: ANY OF THE FOLLOWING: A) THE REGULAR RHYTHM IS BROKEN WITH ONE WITH ONE OR TWO INTERRUPTIONS OR HESITATIONS OF THE MOVEMENT  B) SLIGHT SLOWING  C) THE AMPLITUDE DECREMENTS NEAR THE END OF THE 10 MOVEMENTS.
LEG_AGILITY_RIGHT: (0) NORMAL: NO PROBLEMS.
LEG_AGILITY_LEFT: (1) SLIGHT: ANY OF THE FOLLOWING: A) THE REGULAR RHYTHM IS BROKEN WITH ONE WITH ONE OR TWO INTERRUPTIONS OR HESITATIONS OF THE MOVEMENT  B) SLIGHT SLOWING  C) THE AMPLITUDE DECREMENTS NEAR THE END OF THE 10 MOVEMENTS.
RIGIDITY_NECK: (1) SLIGHT: RIGIDITY ONLY DETECTED WITH ACTIVATION MANEUVER.
RIGIDITY_LLE: (1) SLIGHT: RIGIDITY ONLY DETECTED WITH ACTIVATION MANEUVER.
POSTURAL_STABILITY: (0) NORMAL: NO PROBLEMS. RECOVERS WITH ONE OR TWO STEPS.
HANDMOVEMENTS_RIGHT: (0) NORMAL: NO PROBLEMS.
HANDMOVEMENTS_LEFT: (1) SLIGHT: ANY OF THE FOLLOWING: A) THE REGULAR RHYTHM IS BROKEN WITH ONE WITH ONE OR TWO INTERRUPTIONS OR HESITATIONS OF THE MOVEMENT  B) SLIGHT SLOWING  C) THE AMPLITUDE DECREMENTS NEAR THE END OF THE 10 MOVEMENTS.
FREEZING_GAIT: (0) NORMAL: NO FREEZING.
RIGIDITY_RLE: (0) NORMAL: NO RIGIDITY.
FACIAL_EXPRESSION: (0) NORMAL: NORMAL FACIAL EXPRESSION.
ARISING_CHAIR: (0) NORMAL: NO PROBLEMS. ABLE TO ARISE QUICKLY WITHOUT HESITATION.
SPEECH: (0) NORMAL: NO SPEECH PROBLEMS.
FINGER_TAPPING_RIGHT: (0) NORMAL: NO PROBLEMS.
FINGER_TAPPING_LEFT: (1) SLIGHT: ANY OF THE FOLLOWING: A) THE REGULAR RHYTHM IS BROKEN WITH ONE WITH ONE OR TWO INTERRUPTIONS OR HESITATIONS OF THE MOVEMENT  B) SLIGHT SLOWING  C) THE AMPLITUDE DECREMENTS NEAR THE END OF THE 10 MOVEMENTS.
GAIT: (1) SLIGHT: INDEPENDENT WALKING WITH MINOR GAIT IMPAIRMENT.
TOETAPPING_RIGHT: (0) NORMAL: NO PROBLEMS.
PARKINSONS_MEDS: ON
PRONATION_SUPINATION_LEFT: (0) NORMAL: NO PROBLEMS.
DYSKINESIAS_PRESENT: NO
RIGIDITY_RUE: (0) NORMAL: NO RIGIDITY.
PRONATION_SUPINATION_RIGHT: (0) NORMAL: NO PROBLEMS.

## 2023-11-06 NOTE — PATIENT INSTRUCTIONS
I recommend thinking about regular exercises that you can do on a regular basis. Studies have shown that regular cardio-type exercise has been associated with slower progression of Parkinson's disease.     Continue taking carbidopa/levodopa. I have sent in a 90 day prescription.

## 2023-11-06 NOTE — NURSING NOTE
"Ellen Jones is a 58 year old female who presents for:  Chief Complaint   Patient presents with    Parkinson     Follow up - medication questions        Initial Vitals:  /79   Pulse 69   LMP 06/03/2015 (Exact Date)   SpO2 99%  Estimated body mass index is 20.73 kg/m  as calculated from the following:    Height as of 12/23/13: 1.6 m (5' 3\").    Weight as of 6/11/15: 53.1 kg (117 lb).. There is no height or weight on file to calculate BSA. BP completed using cuff size: lydia Calvo CMA    "

## 2023-11-06 NOTE — LETTER
"    2023         RE: Ellen Jones  [address] [address]        Dear Colleague,    Thank you for referring your patient, Ellen Jones, to the [facility] NEUROLOGY CLINICS [facility]. Please see a copy of my visit note below.    Department of Neurology  Movement Disorders Division   Follow-up Note    Patient: Ellen Jones   MRN: [id number]   : 1965   Date of Visit: 2023    Ms. Jones is a 58 year old right-handed female who presents for follow-up of parkinsonism. She was last seen on 23, at which time she was started on carbidopa/levodopa. Today, she is unaccompanied.    INTERVAL EVENTS:  Since last visit, she reports \"medication has made all the difference in the world\". She finds that it      Related Medications  6 am 10 am 3:30 am   Carbidopa/levodopa 25/100 mg 1 1 1     Last taken at 10 am    ROS:  All others negative except as listed above.    Past Medical History:   Diagnosis Date     NO ACTIVE PROBLEMS         Past Surgical History:   Procedure Laterality Date     [facility] NONSPECIFIC PROCEDURE  2003    hysteroscopy with resection of submucous myoma, Dr. Hugo        Current Outpatient Medications   Medication Sig Dispense Refill     CALCIUM PO Take 1 tablet by mouth daily       carbidopa-levodopa (SINEMET)  MG tablet Take 1 tablet by mouth 3 times daily 270 tablet 3     MULTIVITAMIN TABS   OR 1 TABLET DAILY         No Known Allergies     Family History   Problem Relation Age of Onset     C.A.D. Father      Respiratory Sister         Asthma     Diabetes Father      Breast Cancer Other         Great Grandmother- maternal side     Cancer - colorectal Maternal Grandfather      Cancer Father         esophageal     Osteoporosis Maternal Grandmother      Heart Disease Father         MI        Social History     Socioeconomic History     Marital status: Single     Spouse name: Not on file     Number of children: Not on file     Years of education: " Not on file     Highest education level: Not on file   Occupational History     Not on file   Tobacco Use     Smoking status: Never     Smokeless tobacco: Never   Substance and Sexual Activity     Alcohol use: No     Drug use: No     Sexual activity: Yes     Comment: partner has vasectomy   Other Topics Concern     Not on file   Social History Narrative    Caffeine intake/servings daily - 1    Calcium intake/servings daily - 1-2    Exercise 0 times weekly - describe NA    Sunscreen used - Yes    Seatbelts used - Yes    Guns stored in the home - No    Self Breast Exam - No    Pap test up to date -  No    Eye exam up to date -  Yes    Dental exam up to date -  Yes    DEXA scan up to date -  Not Applicable    Flex Sig/Colonoscopy up to date -  Not Applicable    Mammography up to date -  No    Immunizations reviewed and up to date - Yes    Abuse: Current or Past (Physical, Sexual or Emotional) - No    Do you feel safe in your environment - Yes    Do you cope well with stress - Yes    Do you suffer from insomnia - No    Last updated by: Keren Zaragoza  5/19/2011                 Social Determinants of Health     Financial Resource Strain: Not on file   Food Insecurity: Not on file   Transportation Needs: Not on file   Physical Activity: Not on file   Stress: Not on file   Social Connections: Not on file   Interpersonal Safety: Not on file   Housing Stability: Not on file        PHYSICAL EXAM:  /79   Pulse 69   LMP 06/03/2015 (Exact Date)   SpO2 99%      Gait:  short stride length with spastic gait favoring left leg, minimal armswing bilaterally. Retropulsion intact.         11/6/2023    11:00 AM   UPDRS Motor Scale   Time: 11:40   Medication On   R Brain DBS: None   L Brain DBS: None   Dyskinesia (LID) No   Speech 0   Facial Expression 0   Rigidity Neck 1   Rigidity RUE 0   Rigidity LUE 1   Rigidity RLE 0   Rigidity LLE 1   Finger Taps R 0   Finger Taps L 1   Hand Mvt R 0   Hand Mvt L 1   Pron-/Supinate R 0    Pron-/Supinate L 0   Toe Tap R 0   Toe Tap L 1   Leg Agility R 0   Leg Agility L 1   Arise From Chair 0   Gait 1   Gait Freezing 0   Postural Stability 0   Posture 0      Previously 21 on 8/25/23     ASSESSMENT:  Ms. Jones is a 57 year old right-handed female who presents for follow-up of newly diagnosed Parkinson's disease. She has noticed quite a big improvement with carbidopa/levodopa, so will not make any changes today.     PLAN:  - continue carbidopa/levodopa 25/100 mg TID  - encouraged regular physical activity     RTC 6 months    Romi Blevins MD   of Neurology  [facility]  Department of Neurology       34 minutes spent on the date of the encounter doing chart review, history and exam, documentation and further activities as noted above      Again, thank you for allowing me to participate in the care of your patient.        Sincerely,        Romi Blevins MD

## 2023-11-06 NOTE — PROGRESS NOTES
"Department of Neurology  Movement Disorders Division   Follow-up Note    Patient: Ellen Jones   MRN: [id number]   : 1965   Date of Visit: 2023    Ms. Jones is a 58 year old right-handed female who presents for follow-up of parkinsonism. She was last seen on 23, at which time she was started on carbidopa/levodopa. Today, she is unaccompanied.    INTERVAL EVENTS:  Since last visit, she reports \"medication has made all the difference in the world\". She finds that it      Related Medications  6 am 10 am 3:30 am   Carbidopa/levodopa 25/100 mg 1 1 1     Last taken at 10 am    ROS:  All others negative except as listed above.    Past Medical History:   Diagnosis Date    NO ACTIVE PROBLEMS         Past Surgical History:   Procedure Laterality Date    ZZC NONSPECIFIC PROCEDURE  2003    hysteroscopy with resection of submucous myoma, Dr. Hugo        Current Outpatient Medications   Medication Sig Dispense Refill    CALCIUM PO Take 1 tablet by mouth daily      carbidopa-levodopa (SINEMET)  MG tablet Take 1 tablet by mouth 3 times daily 270 tablet 3    MULTIVITAMIN TABS   OR 1 TABLET DAILY         No Known Allergies     Family History   Problem Relation Age of Onset    C.A.D. Father     Respiratory Sister         Asthma    Diabetes Father     Breast Cancer Other         Great Grandmother- maternal side    Cancer - colorectal Maternal Grandfather     Cancer Father         esophageal    Osteoporosis Maternal Grandmother     Heart Disease Father         MI        Social History     Socioeconomic History    Marital status: Single     Spouse name: Not on file    Number of children: Not on file    Years of education: Not on file    Highest education level: Not on file   Occupational History    Not on file   Tobacco Use    Smoking status: Never    Smokeless tobacco: Never   Substance and Sexual Activity    Alcohol use: No    Drug use: No    Sexual activity: Yes     Comment: partner has " vasectomy   Other Topics Concern    Not on file   Social History Narrative    Caffeine intake/servings daily - 1    Calcium intake/servings daily - 1-2    Exercise 0 times weekly - describe NA    Sunscreen used - Yes    Seatbelts used - Yes    Guns stored in the home - No    Self Breast Exam - No    Pap test up to date -  No    Eye exam up to date -  Yes    Dental exam up to date -  Yes    DEXA scan up to date -  Not Applicable    Flex Sig/Colonoscopy up to date -  Not Applicable    Mammography up to date -  No    Immunizations reviewed and up to date - Yes    Abuse: Current or Past (Physical, Sexual or Emotional) - No    Do you feel safe in your environment - Yes    Do you cope well with stress - Yes    Do you suffer from insomnia - No    Last updated by: Keren Zaragoza  5/19/2011                 Social Determinants of Health     Financial Resource Strain: Not on file   Food Insecurity: Not on file   Transportation Needs: Not on file   Physical Activity: Not on file   Stress: Not on file   Social Connections: Not on file   Interpersonal Safety: Not on file   Housing Stability: Not on file        PHYSICAL EXAM:  /79   Pulse 69   LMP 06/03/2015 (Exact Date)   SpO2 99%      Gait:  short stride length with spastic gait favoring left leg, minimal armswing bilaterally. Retropulsion intact.         11/6/2023    11:00 AM   UPDRS Motor Scale   Time: 11:40   Medication On   R Brain DBS: None   L Brain DBS: None   Dyskinesia (LID) No   Speech 0   Facial Expression 0   Rigidity Neck 1   Rigidity RUE 0   Rigidity LUE 1   Rigidity RLE 0   Rigidity LLE 1   Finger Taps R 0   Finger Taps L 1   Hand Mvt R 0   Hand Mvt L 1   Pron-/Supinate R 0   Pron-/Supinate L 0   Toe Tap R 0   Toe Tap L 1   Leg Agility R 0   Leg Agility L 1   Arise From Chair 0   Gait 1   Gait Freezing 0   Postural Stability 0   Posture 0      Previously 21 on 8/25/23     ASSESSMENT:  Ms. Jones is a 57 year old right-handed female who presents for  follow-up of newly diagnosed Parkinson's disease. She has noticed quite a big improvement with carbidopa/levodopa, so will not make any changes today.     PLAN:  - continue carbidopa/levodopa 25/100 mg TID  - encouraged regular physical activity     RTC 6 months    Romi Blevins MD   of Neurology  [facility]  Department of Neurology       34 minutes spent on the date of the encounter doing chart review, history and exam, documentation and further activities as noted above   Monitor groin site for swelling, bleeding  Continue ASA, Plavix

## 2024-02-03 ENCOUNTER — HEALTH MAINTENANCE LETTER (OUTPATIENT)
Age: 59
End: 2024-02-03

## 2024-05-02 ENCOUNTER — TELEPHONE (OUTPATIENT)
Dept: NEUROLOGY | Facility: CLINIC | Age: 59
End: 2024-05-02
Payer: COMMERCIAL

## 2024-05-06 ENCOUNTER — OFFICE VISIT (OUTPATIENT)
Dept: NEUROLOGY | Facility: CLINIC | Age: 59
End: 2024-05-06
Payer: COMMERCIAL

## 2024-05-06 VITALS
WEIGHT: 115 LBS | BODY MASS INDEX: 20.38 KG/M2 | SYSTOLIC BLOOD PRESSURE: 114 MMHG | HEART RATE: 78 BPM | HEIGHT: 63 IN | OXYGEN SATURATION: 100 % | DIASTOLIC BLOOD PRESSURE: 74 MMHG

## 2024-05-06 DIAGNOSIS — G20.A1 PARKINSON'S DISEASE WITHOUT DYSKINESIA OR FLUCTUATING MANIFESTATIONS (H): Primary | ICD-10-CM

## 2024-05-06 PROCEDURE — 99213 OFFICE O/P EST LOW 20 MIN: CPT | Performed by: PSYCHIATRY & NEUROLOGY

## 2024-05-06 ASSESSMENT — UNIFIED PARKINSONS DISEASE RATING SCALE (UPDRS)
PARKINSONS_MEDS: ON
AMPLITUDE_RLE: (0) NORMAL: NO TREMOR.
AMPLITUDE_LIP_JAW: (0) NORMAL: NO TREMOR.
GAIT: (1) SLIGHT: INDEPENDENT WALKING WITH MINOR GAIT IMPAIRMENT.
FREEZING_GAIT: (0) NORMAL: NO FREEZING.
RIGIDITY_RUE: (0) NORMAL: NO RIGIDITY.
AMPLITUDE_LLE: (0) NORMAL: NO TREMOR.
TOTAL_SCORE: 0
TOTAL_SCORE: 8
DYSKINESIAS_PRESENT: NO
RIGIDITY_RLE: (0) NORMAL: NO RIGIDITY.
FINGER_TAPPING_LEFT: (1) SLIGHT: ANY OF THE FOLLOWING: A) THE REGULAR RHYTHM IS BROKEN WITH ONE WITH ONE OR TWO INTERRUPTIONS OR HESITATIONS OF THE MOVEMENT  B) SLIGHT SLOWING  C) THE AMPLITUDE DECREMENTS NEAR THE END OF THE 10 MOVEMENTS.
FACIAL_EXPRESSION: (2) MILD: IN ADDITION TO DECREASED EYE-BLINK FREQUENCY, MASKED FACIES PRESENT IN THE LOWER FACE AS WELL, NAMELY FEWER MOVEMENTS AROUND THE MOUTH, SUCH AS LESS SPONTANEOUS SMILING, BUT LIPS NOT PARTED.
ARISING_CHAIR: (0) NORMAL: NO PROBLEMS. ABLE TO ARISE QUICKLY WITHOUT HESITATION.
AXIAL_SCORE: 4
PRONATION_SUPINATION_LEFT: (0) NORMAL: NO PROBLEMS.
SPEECH: (0) NORMAL: NO SPEECH PROBLEMS.
LEG_AGILITY_RIGHT: (0) NORMAL: NO PROBLEMS.
TOTAL_SCORE_LEFT: 4
TOETAPPING_LEFT: (1) SLIGHT: ANY OF THE FOLLOWING: A) THE REGULAR RHYTHM IS BROKEN WITH ONE WITH ONE OR TWO INTERRUPTIONS OR HESITATIONS OF THE MOVEMENT  B) SLIGHT SLOWING  C) THE AMPLITUDE DECREMENTS NEAR THE END OF THE 10 MOVEMENTS.
AMPLITUDE_LUE: (0) NORMAL: NO TREMOR.
CONSTANCY_TREMOR_ATREST: (0) NORMAL: NO TREMOR.
PRONATION_SUPINATION_RIGHT: (0) NORMAL: NO PROBLEMS.
RIGIDITY_LUE: (1) SLIGHT: RIGIDITY ONLY DETECTED WITH ACTIVATION MANEUVER.
RIGIDITY_NECK: (1) SLIGHT: RIGIDITY ONLY DETECTED WITH ACTIVATION MANEUVER.
POSTURE: (0) NORMAL: NO PROBLEMS.
LEG_AGILITY_LEFT: (0) NORMAL: NO PROBLEMS.
HANDMOVEMENTS_LEFT: (1) SLIGHT: ANY OF THE FOLLOWING: A) THE REGULAR RHYTHM IS BROKEN WITH ONE WITH ONE OR TWO INTERRUPTIONS OR HESITATIONS OF THE MOVEMENT  B) SLIGHT SLOWING  C) THE AMPLITUDE DECREMENTS NEAR THE END OF THE 10 MOVEMENTS.
FINGER_TAPPING_RIGHT: (0) NORMAL: NO PROBLEMS.
AMPLITUDE_RUE: (0) NORMAL: NO TREMOR.
SPONTANEITY_OF_MOVEMENT: (0) NORMAL: NO PROBLEMS.
TOETAPPING_RIGHT: (0) NORMAL: NO PROBLEMS.
HANDMOVEMENTS_RIGHT: (0) NORMAL: NO PROBLEMS.
POSTURAL_STABILITY: (0) NORMAL: NO PROBLEMS. RECOVERS WITH ONE OR TWO STEPS.
RIGIDITY_LLE: (0) NORMAL: NO RIGIDITY.

## 2024-05-06 NOTE — PROGRESS NOTES
"Department of Neurology  Movement Disorders Division   Follow-up Note    Patient: Ellen Jones   MRN: [id number]   : 1965   Date of Visit: 2024    Ms. Jones is a 58 year old right-handed female who presents for follow-up of parkinsonism. She was last seen on 2023, at which time no changes were made. Today, she is unaccompanied.    INTERVAL EVENTS:  Since last visit, she reports that medication continues to work.  Most notably she finds typing something that is easier to do with her left hand.  Symptoms continue to be predominantly in the left hand. No wearing off. No side effects.    She has had some pain in her left foot, she describes as \"feels like plantar fasciitis\".  She has not done physical therapy on this.  For her exercise, she has increased her walking outside now that the weather is nice.  She has not worked with physical therapy since around .    Related Medications  6 am 10 am 4 pm   Carbidopa/levodopa 25/100 mg 1 1 1     Last taken at 6:40 am    ROS:  All others negative except as listed above.    Current Outpatient Medications   Medication Sig Dispense Refill    CALCIUM PO Take 1 tablet by mouth daily      carbidopa-levodopa (SINEMET)  MG tablet Take 1 tablet by mouth 3 times daily 270 tablet 3    MULTIVITAMIN TABS   OR 1 TABLET DAILY         No Known Allergies     Family History   Problem Relation Age of Onset    C.A.D. Father     Respiratory Sister         Asthma    Diabetes Father     Breast Cancer Other         Great Grandmother- maternal side    Cancer - colorectal Maternal Grandfather     Cancer Father         esophageal    Osteoporosis Maternal Grandmother     Heart Disease Father         MI        Social History     Socioeconomic History    Marital status: Single     Spouse name: Not on file    Number of children: Not on file    Years of education: Not on file    Highest education level: Not on file   Occupational History    Not on file   Tobacco Use    " Smoking status: Never    Smokeless tobacco: Never   Substance and Sexual Activity    Alcohol use: No    Drug use: No    Sexual activity: Yes     Comment: partner has vasectomy   Other Topics Concern    Not on file   Social History Narrative    Caffeine intake/servings daily - 1    Calcium intake/servings daily - 1-2    Exercise 0 times weekly - describe NA    Sunscreen used - Yes    Seatbelts used - Yes    Guns stored in the home - No    Self Breast Exam - No    Pap test up to date -  No    Eye exam up to date -  Yes    Dental exam up to date -  Yes    DEXA scan up to date -  Not Applicable    Flex Sig/Colonoscopy up to date -  Not Applicable    Mammography up to date -  No    Immunizations reviewed and up to date - Yes    Abuse: Current or Past (Physical, Sexual or Emotional) - No    Do you feel safe in your environment - Yes    Do you cope well with stress - Yes    Do you suffer from insomnia - No    Last updated by: Keren Zaragoza  5/19/2011                 Social Determinants of Health     Financial Resource Strain: Low Risk  (12/20/2021)    Received from [facility]    Overall Financial Resource Strain (CARDIA)     Difficulty of Paying Living Expenses: Not very hard   Food Insecurity: No Food Insecurity (12/20/2021)    Received from [facility]    Hunger Vital Sign     Worried About Running Out of Food in the Last Year: Never true     Ran Out of Food in the Last Year: Never true   Transportation Needs: No Transportation Needs (12/20/2021)    Received from [facility]    PRAPARE - Transportation     Lack of Transportation (Medical): No     Lack of Transportation (Non-Medical): No   Physical Activity: Inactive (12/20/2021)    Received from [facility]    Exercise Vital Sign     Days of Exercise per Week: 0 days     Minutes of Exercise per Session: 0 min   Stress: No Stress Concern Present (12/20/2021)    Received from [facility]    [place] [employer] - Occupational Stress Questionnaire     " Feeling of Stress : Only a little   Social Connections: Unknown (10/20/2022)    Received [facility]    Social Connections     Frequency of Communication with Friends and Family: Not on file   Interpersonal Safety: Not At Risk (12/20/2021)    Received from [facility]    Humiliation, Afraid, Rape, and Kick questionnaire     Fear of Current or Ex-Partner: No     Emotionally Abused: No     Physically Abused: No     Sexually Abused: No   Housing Stability: Low Risk  (12/20/2021)    Received from [facility]    Housing Stability Vital Sign     Unable to Pay for Housing in the Last Year: No     Number of Places Lived in the Last Year: 1     In the last 12 months, was there a time when you did not have a steady place to sleep or slept in a shelter (including now)?: No        PHYSICAL EXAM:  /74   Pulse 78   Ht 1.6 m (5' 3\")   Wt 52.2 kg (115 lb)   LMP 06/03/2015 (Exact Date)   SpO2 100%   BMI 20.37 kg/m       Gait: Antalgic gait favoring left leg, no arm swing on the left side, slightly diminished on the right.  No en bloc turn, no freezing.        5/6/2024     8:00 AM   UPDRS Motor Scale   Time: 08:34   Medication On   R Brain DBS: None   L Brain DBS: None   Dyskinesia (LID) No   Speech 0   Facial Expression 2   Rigidity Neck 1   Rigidity RUE 0   Rigidity LUE 1   Rigidity RLE 0   Rigidity LLE 0   Finger Taps R 0   Finger Taps L 1   Hand Mvt R 0   Hand Mvt L 1   Pron-/Supinate R 0   Pron-/Supinate L 0   Toe Tap R 0   Toe Tap L 1   Leg Agility R 0   Leg Agility L 0   Arise From Chair 0   Gait 1   Gait Freezing 0   Postural Stability 0   Posture 0   Global Spont Mvt 0   Postural Tremor RUE 0   Postural Tremor LUE 0   Kinetic Tremor RUE 0   Kinetic Tremor LUE 0   Rest Tremor RUE 0   Rest Tremor LUE 0   Rest Tremor RLE 0   Rest Tremor LLE 0   Rest Tremor Lip/Jaw 0   Rest Tremor Constancy 0   Total Right 0   Total Left 4   Axial " Total 4   Total 8     ASSESSMENT:  Ms. Jones is a 58 year old right-handed female who presents for follow-up of parkinsonism. She continues to notice good symptom management with carbidopa/levodopa, so will not make any changes today.     PLAN:  -continue carbidopa/levodopa 25/100 mg 1 tab TID   -encouraged regular physical activity, recommend discussing left foot pain with primary care provider     RTC in 6 months, 30 min    Romi Blevins MD    [facility]  Department of Neurology       20 minutes spent on the date of the encounter doing chart review, history and exam, documentation and further activities as noted above

## 2024-05-06 NOTE — PATIENT INSTRUCTIONS
No changes today - keep staying active.     I recommend talking to your primary care provider about your left foot.     If you would like to work with physical therapy, please let me know.

## 2024-05-06 NOTE — LETTER
"    2024         RE: Ellen Jones  [address] [address]        Dear Colleague,    Thank you for referring your patient, Ellen Jones, to the [facility]. Please see a copy of my visit note below.    Department of Neurology  Movement Disorders Division   Follow-up Note    Patient: Ellen Jones   MRN: [id number]   : 1965   Date of Visit: 2024    Ms. Jones is a 58 year old right-handed female who presents for follow-up of parkinsonism. She was last seen on 2023, at which time no changes were made. Today, she is unaccompanied.    INTERVAL EVENTS:  Since last visit, she reports that medication continues to work.  Most notably she finds typing something that is easier to do with her left hand.  Symptoms continue to be predominantly in the left hand. No wearing off. No side effects.    She has had some pain in her left foot, she describes as \"feels like plantar fasciitis\".  She has not done physical therapy on this.  For her exercise, she has increased her walking outside now that the weather is nice.  She has not worked with physical therapy since around .    Related Medications  6 am 10 am 4 pm   Carbidopa/levodopa 25/100 mg 1 1 1     Last taken at 6:40 am    ROS:  All others negative except as listed above.    Current Outpatient Medications   Medication Sig Dispense Refill     CALCIUM PO Take 1 tablet by mouth daily       carbidopa-levodopa (SINEMET)  MG tablet Take 1 tablet by mouth 3 times daily 270 tablet 3     MULTIVITAMIN TABS   OR 1 TABLET DAILY         No Known Allergies     Family History   Problem Relation Age of Onset     C.A.D. Father      Respiratory Sister         Asthma     Diabetes Father      Breast Cancer Other         Great Grandmother- maternal side     Cancer - colorectal Maternal Grandfather      Cancer Father         esophageal     Osteoporosis Maternal Grandmother      Heart Disease Father       "   MI        Social History     Socioeconomic History     Marital status: Single     Spouse name: Not on file     Number of children: Not on file     Years of education: Not on file     Highest education level: Not on file   Occupational History     Not on file   Tobacco Use     Smoking status: Never     Smokeless tobacco: Never   Substance and Sexual Activity     Alcohol use: No     Drug use: No     Sexual activity: Yes     Comment: partner has vasectomy   Other Topics Concern     Not on file   Social History Narrative    Caffeine intake/servings daily - 1    Calcium intake/servings daily - 1-2    Exercise 0 times weekly - describe NA    Sunscreen used - Yes    Seatbelts used - Yes    Guns stored in the home - No    Self Breast Exam - No    Pap test up to date -  No    Eye exam up to date -  Yes    Dental exam up to date -  Yes    DEXA scan up to date -  Not Applicable    Flex Sig/Colonoscopy up to date -  Not Applicable    Mammography up to date -  No    Immunizations reviewed and up to date - Yes    Abuse: Current or Past (Physical, Sexual or Emotional) - No    Do you feel safe in your environment - Yes    Do you cope well with stress - Yes    Do you suffer from insomnia - No    Last updated by: Keren Zaragoza  5/19/2011                 Social Determinants of Health     Financial Resource Strain: Low Risk  (12/20/2021)    Received from [facility]    Overall Financial Resource Strain (CARDIA)      Difficulty of Paying Living Expenses: Not very hard   Food Insecurity: No Food Insecurity (12/20/2021)    Received from [facility]    Hunger Vital Sign      Worried About Running Out of Food in the Last Year: Never true      Ran Out of Food in the Last Year: Never true   Transportation Needs: No Transportation Needs (12/20/2021)    Received from [facility]    PRAPARE - Transportation      Lack of Transportation (Medical): No      Lack of Transportation (Non-Medical): No   Physical Activity: Inactive (12/20/2021)     "Received from [facility]    Exercise Vital Sign      Days of Exercise per Week: 0 days      Minutes of Exercise per Session: 0 min   Stress: No Stress Concern Present (12/20/2021)    Received from [facility]    [employer] [place] [employer] Occupational Health - Occupational Stress Questionnaire      Feeling of Stress : Only a little   Social Connections: Unknown (10/20/2022)    Received from [employer] [facility]  [employer] [facility]    Social Connections      Frequency of Communication with Friends and Family: Not on file   Interpersonal Safety: Not At Risk (12/20/2021)    Received from [facility]    Humiliation, Afraid, Rape, and Kick questionnaire      Fear of Current or Ex-Partner: No      Emotionally Abused: No      Physically Abused: No      Sexually Abused: No   Housing Stability: Low Risk  (12/20/2021)    Received from [facility]    Housing Stability Vital Sign      Unable to Pay for Housing in the Last Year: No      Number of Places Lived in the Last Year: 1      In the last 12 months, was there a time when you did not have a steady place to sleep or slept in a shelter (including now)?: No        PHYSICAL EXAM:  /74   Pulse 78   Ht 1.6 m (5' 3\")   Wt 52.2 kg (115 lb)   LMP 06/03/2015 (Exact Date)   SpO2 100%   BMI 20.37 kg/m       Gait: Antalgic gait favoring left leg, no arm swing on the left side, slightly diminished on the right.  No en bloc turn, no freezing.        5/6/2024     8:00 AM   UPDRS Motor Scale   Time: 08:34   Medication On   R Brain DBS: None   L Brain DBS: None   Dyskinesia (LID) No   Speech 0   Facial Expression 2   Rigidity Neck 1   Rigidity RUE 0   Rigidity LUE 1   Rigidity RLE 0   Rigidity LLE 0   Finger Taps R 0   Finger Taps L 1   Hand Mvt R 0   Hand Mvt L 1   Pron-/Supinate R 0   Pron-/Supinate L 0   Toe Tap R 0   Toe Tap L 1   Leg Agility R 0   Leg Agility L 0   Arise From Chair 0   Gait 1   Gait Freezing 0   Postural " Stability 0   Posture 0   Global Spont Mvt 0   Postural Tremor RUE 0   Postural Tremor LUE 0   Kinetic Tremor RUE 0   Kinetic Tremor LUE 0   Rest Tremor RUE 0   Rest Tremor LUE 0   Rest Tremor RLE 0   Rest Tremor LLE 0   Rest Tremor Lip/Jaw 0   Rest Tremor Constancy 0   Total Right 0   Total Left 4   Axial Total 4   Total 8     ASSESSMENT:  Ms. Jones is a 58 year old right-handed female who presents for follow-up of parkinsonism. She continues to notice good symptom management with carbidopa/levodopa, so will not make any changes today.     PLAN:  -continue carbidopa/levodopa 25/100 mg 1 tab TID   -encouraged regular physical activity, recommend discussing left foot pain with primary care provider     RTC in 6 months, 30 min    Romi Blevins MD    [facility]  Department of Neurology       20 minutes spent on the date of the encounter doing chart review, history and exam, documentation and further activities as noted above      Again, thank you for allowing me to participate in the care of your patient.        Sincerely,        Romi Blevins MD

## 2024-05-06 NOTE — NURSING NOTE
"Ellen Jones is a 58 year old female who presents for:  Chief Complaint   Patient presents with    Parkinson        Initial Vitals:  /74   Pulse 78   Ht 1.6 m (5' 3\")   Wt 52.2 kg (115 lb)   LMP 06/03/2015 (Exact Date)   SpO2 100%   BMI 20.37 kg/m   Estimated body mass index is 20.37 kg/m  as calculated from the following:    Height as of this encounter: 1.6 m (5' 3\").    Weight as of this encounter: 52.2 kg (115 lb).. Body surface area is 1.52 meters squared. BP completed using cuff size: lydia Calvo CMA    "

## 2024-11-09 ENCOUNTER — HEALTH MAINTENANCE LETTER (OUTPATIENT)
Age: 59
End: 2024-11-09

## 2024-11-09 NOTE — PROGRESS NOTES
"Department of Neurology  Movement Disorders Division  Follow-up Patient Visit    Patient: Ellen Jones  MRN: [id number]  : 1965  Date of Visit: 2024  PCP: Pat Gandhi  Referring Provider: Referred Self, MD  No address on file    CC: Parkinson's Disease    HPI:  Ellen Jones is a 59yr old R-handed female w/ PMHx pineal gland cyst s/p removal () and cervical radiculopathy who presents for follow-up of Parkinson's Disease.     Symptom onset was in  when she began having numbness/tingling in her L foot. She underwent an EMG which noted only a chronic, inactive left L5 radiculopathy. She was then seen at [place] who \"ruled out radiculopathy\". In 2021, her physicians mentioned parkinsonisms. 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 Her symptoms continued to progress and she was first seen in this clinic in 2023 where exam revealed LUE/LLE rigidity, bradykinesia, and minor L hand action tremor. She also mentioned L toe cramping and cognitive slowing. 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 However her improvement with CD/LD would suggest the former    INTERVAL EVENTS:  The patient was last seen on 2024, at which time she was reporting good control of her parkinsonism on CD/LD (25-100mg) 1 tab TID, with most symptoms being present in her L hand. However, she was now noticing pain in her L foot similar to plantar fasciitis. As such, no changes to her medications were made and she was encouraged to work with her PCP on the L foot pain    Today, the patient reports that things are going well. Pertaining to her L foot pain, this has largely resolved after getting orthotics. Her PD symptoms also remain under good control. This still manifests largely as stiffness/slowness in the L-side of her body. She does not notice much in terms of wearing off between doses, but does " note that during the afternoon and her 4PM pill is late - if she goes for a walk - she will have more foot cramping. If she takes her CD/LD dose after this, it will alleviate this phenomenon suggesting this is a symptom of an OFF period. However, she does not feel this happens often enough or is severe enough to warrant medication change    When her foot cramps, she will need to rest and massage her foot. Otherwise, her walking is unproblematic. She denies any falls of gait freezing. The patient does not currently drive as she feels she has difficulty keeping the car inside the lanes at high speeds. She is still working and has no trouble with this    She also denies any speech/swallow issues, GI issues, urinary issues, dizziness, or hallucinations. She reports she sleeps well and has no concern for dream enactment    MEDICATIONS:  Pertinent Movement Medications   6AM 10AM 4PM   CD/LD (25-100mg) 1 1 1         Last taken today at ~6AM    PHYSICAL EXAM:  /73 (BP Location: Right arm, Patient Position: Supine, Cuff Size: Adult Regular)   Pulse 75   LMP 06/03/2015 (Exact Date)   SpO2 99%     No arm swing on the left side, moderately diminished on the right.  No en bloc turn, no freezing. No shuffling gait. Narrow stance      11/6/2023    11:00 AM 5/6/2024     8:00 AM   UPDRS Motor Scale   Time: 11:40 08:34   Medication On On   R Brain DBS: None None   L Brain DBS: None None   Dyskinesia (LID) No No   Speech 0 0   Facial Expression 0 2   Rigidity Neck 1 1   Rigidity RUE 0 0   Rigidity LUE 1 1   Rigidity RLE 0 0   Rigidity LLE 1 0   Finger Taps R 0 0   Finger Taps L 1 1   Hand Mvt R 0 0   Hand Mvt L 1 1   Pron-/Supinate R 0 0   Pron-/Supinate L 0 0   Toe Tap R 0 0   Toe Tap L 1 1   Leg Agility R 0 0   Leg Agility L 1 0   Arise From Chair 0 0   Gait 1 1   Gait Freezing 0 0   Postural Stability 0 0   Posture 0 0   Global Spont Mvt  0   Postural Tremor RUE  0   Postural Tremor LUE  0   Kinetic Tremor RUE  0   Kinetic  Tremor LUE  0   Rest Tremor RUE  0   Rest Tremor LUE  0   Rest Tremor RLE  0   Rest Tremor LLE  0   Rest Tremor Lip/Jaw  0   Rest Tremor Constancy  0   Total Right  0   Total Left  4   Axial Total  4   Total  8       DATA:  PET CT Brain (12/30/2021)  IMPRESSION:  Nonspecific slight patchy parietal lobe hypometabolism. Please see findings for details and other observations.     MRI Brain and C-Spine w/ and w/o contrast (12/23/2021)  IMPRESSION:  No findings for demyelinating lesions in the brain or cervical spinal cord.   Stable pineal cyst.     MRI L-Spine w/ and w/o contrast (12/23/2021)  IMPRESSION:  Stable MRI of the lumbar spine compared to 04/08/2021.   Degenerative changes most marked at the L3-L5 levels as detailed in the findings, resulting in mild lateral recess stenosis bilaterally at L4-L5. No significant spinal canal stenosis or neural foraminal narrowing.     EMG (12/23/2021)  IMPRESSION:  Abnormal study.  There continues to be electrophysiologic evidence of mild chronic inactive left L5 radiculopathy, which has been stable since prior study from 04/08/2021. There is no electrophysiologic evidence of a left cervical radiculopathy, brachial plexopathy, or mononeuropathy of the left upper limb.       ASSESSMENT:  Ellen Jones is a 59yr old R-handed female w/ PMHx pineal gland cyst s/p removal (2022) and cervical radiculopathy who presents for follow-up of Parkinson's Disease. Today the patient is reporting stability of her symptoms, mentioning only that she will occasionally have L foot cramping/dystonia if she is late to take her 4PM pill and then exercises. This is suggestive of an OFF phenomenon. Still, the patient does not feel it is bothersome enough to want to change medications. One thing we could consider in the future is adding on a PRN dose or else increasing the 4PM dose to 1.5 tab. We otherwise discussed on how to stay active, especially during the winter. We will send her some information  on this via [employer] and also provide a referral to PT    PLAN:  - Continue with your current medications   6AM 10AM 4PM   CD/LD (25-100mg) 1 1 1   - In the future, we could consider adding on an as needed dose of carbidopa/levodopa or increasing your 4PM by 1/2 tablets  - Physical Therapy referral  - We will have our movement disorder nurses send you some information about local exercise groups and other parkinson's exercise resources  - Follow-up in 6mo    This patient was seen with Movement Disorder Specialist, Dr Blevins, who agrees with the above plan    Cyndi Angulo MD  Movement Disorder Fellow

## 2024-11-11 ENCOUNTER — OFFICE VISIT (OUTPATIENT)
Dept: NEUROLOGY | Facility: CLINIC | Age: 59
End: 2024-11-11
Payer: COMMERCIAL

## 2024-11-11 VITALS — OXYGEN SATURATION: 99 % | HEART RATE: 75 BPM | SYSTOLIC BLOOD PRESSURE: 107 MMHG | DIASTOLIC BLOOD PRESSURE: 73 MMHG

## 2024-11-11 DIAGNOSIS — G20.A2 PARKINSON'S DISEASE WITHOUT DYSKINESIA, WITH FLUCTUATING MANIFESTATIONS (H): Primary | ICD-10-CM

## 2024-11-11 PROCEDURE — 99214 OFFICE O/P EST MOD 30 MIN: CPT | Mod: GC | Performed by: PSYCHIATRY & NEUROLOGY

## 2024-11-11 ASSESSMENT — UNIFIED PARKINSONS DISEASE RATING SCALE (UPDRS)
LEG_AGILITY_LEFT: (1) SLIGHT: ANY OF THE FOLLOWING: A) THE REGULAR RHYTHM IS BROKEN WITH ONE WITH ONE OR TWO INTERRUPTIONS OR HESITATIONS OF THE MOVEMENT  B) SLIGHT SLOWING  C) THE AMPLITUDE DECREMENTS NEAR THE END OF THE 10 MOVEMENTS.
TOTAL_SCORE: 1
RIGIDITY_LUE: (2) MILD: RIGIDITY DETECTED WITHOUT THE ACTIVATION MANEUVER. FULL RANGE OF MOTION IS EASILY ACHIEVED.
POSTURE: (0) NORMAL: NO PROBLEMS.
TOETAPPING_RIGHT: (0) NORMAL: NO PROBLEMS.
PRONATION_SUPINATION_LEFT: (1) SLIGHT: ANY OF THE FOLLOWING: A) THE REGULAR RHYTHM IS BROKEN WITH ONE WITH ONE OR TWO INTERRUPTIONS OR HESITATIONS OF THE MOVEMENT  B) SLIGHT SLOWING  C) THE AMPLITUDE DECREMENTS NEAR THE END OF THE 10 MOVEMENTS.
TOTAL_SCORE_LEFT: 6
TOETAPPING_LEFT: (0) NORMAL: NO PROBLEMS.
TOTAL_SCORE: 10
GAIT: (1) SLIGHT: INDEPENDENT WALKING WITH MINOR GAIT IMPAIRMENT.
FACIAL_EXPRESSION: (1) SLIGHT: MINIMAL MASKED FACIES MANIFESTED ONLY BY DECREASED FREQUENCY OF BLINKING.
LEG_AGILITY_RIGHT: (0) NORMAL: NO PROBLEMS.
POSTURAL_STABILITY: (0) NORMAL: NO PROBLEMS. RECOVERS WITH ONE OR TWO STEPS.
ARISING_CHAIR: (0) NORMAL: NO PROBLEMS. ABLE TO ARISE QUICKLY WITHOUT HESITATION.
RIGIDITY_RLE: (0) NORMAL: NO RIGIDITY.
CONSTANCY_TREMOR_ATREST: (0) NORMAL: NO TREMOR.
RIGIDITY_NECK: (0) NORMAL: NO RIGIDITY.
RIGIDITY_LLE: (0) NORMAL: NO RIGIDITY.
FINGER_TAPPING_LEFT: (1) SLIGHT: ANY OF THE FOLLOWING: A) THE REGULAR RHYTHM IS BROKEN WITH ONE WITH ONE OR TWO INTERRUPTIONS OR HESITATIONS OF THE MOVEMENT  B) SLIGHT SLOWING  C) THE AMPLITUDE DECREMENTS NEAR THE END OF THE 10 MOVEMENTS.
AMPLITUDE_RLE: (0) NORMAL: NO TREMOR.
MOVEMENTS_INTERFERE_WITH_RATINGS: NO
DYSKINESIAS_PRESENT: NO
PRONATION_SUPINATION_RIGHT: (0) NORMAL: NO PROBLEMS.
PARKINSONS_MEDS: ON
AMPLITUDE_LIP_JAW: (0) NORMAL: NO TREMOR.
HANDMOVEMENTS_LEFT: (1) SLIGHT: ANY OF THE FOLLOWING: A) THE REGULAR RHYTHM IS BROKEN WITH ONE WITH ONE OR TWO INTERRUPTIONS OR HESITATIONS OF THE MOVEMENT  B) SLIGHT SLOWING  C) THE AMPLITUDE DECREMENTS NEAR THE END OF THE 10 MOVEMENTS.
AMPLITUDE_RUE: (0) NORMAL: NO TREMOR.
AMPLITUDE_LUE: (0) NORMAL: NO TREMOR.
SPONTANEITY_OF_MOVEMENT: (1) SLIGHT: SLIGHT GLOBAL SLOWNESS AND POVERTY OF SPONTANEOUS MOVEMENTS.
FREEZING_GAIT: (0) NORMAL: NO FREEZING.
FINGER_TAPPING_RIGHT: (0) NORMAL: NO PROBLEMS.
HANDMOVEMENTS_RIGHT: (0) NORMAL: NO PROBLEMS.
AMPLITUDE_LLE: (0) NORMAL: NO TREMOR.
RIGIDITY_RUE: (1) SLIGHT: RIGIDITY ONLY DETECTED WITH ACTIVATION MANEUVER.
AXIAL_SCORE: 3
SPEECH: (0) NORMAL: NO SPEECH PROBLEMS.

## 2024-11-11 NOTE — PATIENT INSTRUCTIONS
- Continue with your current medications   6AM 10AM 4PM   CD/LD (25-100mg) 1 1 1   - In the future, we could consider adding on an as needed dose of carbidopa/levodopa or increasing your 4PM by 1/2 tablets  - Physical Therapy referral  - We will have our movement disorder nurses send you some information about local exercise groups and other parkinson's exercise resources  - Follow-up in 6mo

## 2024-11-11 NOTE — LETTER
"2024      Ellen Jones  [address] [address]      Dear Colleague,    Thank you for referring your patient, Ellen Jones, to the [facility]. Please see a copy of my visit note below.    Department of Neurology  Movement Disorders Division  Follow-up Patient Visit    Patient: Ellen Jones  MRN: [id number]  : 1965  Date of Visit: 2024  PCP: Pat Gandhi  Referring Provider: Referred Self, MD  No address on file    CC: Parkinson's Disease    HPI:  Ellen Jones is a 59yr old R-handed female w/ PMHx pineal gland cyst s/p removal () and cervical radiculopathy who presents for follow-up of Parkinson's Disease.     Symptom onset was in  when she began having numbness/tingling in her L foot. She underwent an EMG which noted only a chronic, inactive left L5 radiculopathy. She was then seen at [place] who \"ruled out radiculopathy\". In 2021, her physicians mentioned parkinsonisms. 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 Her symptoms continued to progress and she was first seen in this clinic in 2023 where exam revealed LUE/LLE rigidity, bradykinesia, and minor L hand action tremor. She also mentioned L toe cramping and cognitive slowing. 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 However her improvement with CD/LD would suggest the former    INTERVAL EVENTS:  The patient was last seen on 2024, at which time she was reporting good control of her parkinsonism on CD/LD (25-100mg) 1 tab TID, with most symptoms being present in her L hand. However, she was now noticing pain in her L foot similar to plantar fasciitis. As such, no changes to her medications were made and she was encouraged to work with her PCP on the L foot pain    Today, the patient reports that things are going well. Pertaining to her L foot " pain, this has largely resolved after getting orthotics. Her PD symptoms also remain under good control. This still manifests largely as stiffness/slowness in the L-side of her body. She does not notice much in terms of wearing off between doses, but does note that during the afternoon and her 4PM pill is late - if she goes for a walk - she will have more foot cramping. If she takes her CD/LD dose after this, it will alleviate this phenomenon suggesting this is a symptom of an OFF period. However, she does not feel this happens often enough or is severe enough to warrant medication change    When her foot cramps, she will need to rest and massage her foot. Otherwise, her walking is unproblematic. She denies any falls of gait freezing. The patient does not currently drive as she feels she has difficulty keeping the car inside the lanes at high speeds. She is still working and has no trouble with this    She also denies any speech/swallow issues, GI issues, urinary issues, dizziness, or hallucinations. She reports she sleeps well and has no concern for dream enactment    MEDICATIONS:  Pertinent Movement Medications   6AM 10AM 4PM   CD/LD (25-100mg) 1 1 1         Last taken today at ~6AM    PHYSICAL EXAM:  /73 (BP Location: Right arm, Patient Position: Supine, Cuff Size: Adult Regular)   Pulse 75   LMP 06/03/2015 (Exact Date)   SpO2 99%     No arm swing on the left side, moderately diminished on the right.  No en bloc turn, no freezing. No shuffling gait. Narrow stance      11/6/2023    11:00 AM 5/6/2024     8:00 AM   UPDRS Motor Scale   Time: 11:40 08:34   Medication On On   R Brain DBS: None None   L Brain DBS: None None   Dyskinesia (LID) No No   Speech 0 0   Facial Expression 0 2   Rigidity Neck 1 1   Rigidity RUE 0 0   Rigidity LUE 1 1   Rigidity RLE 0 0   Rigidity LLE 1 0   Finger Taps R 0 0   Finger Taps L 1 1   Hand Mvt R 0 0   Hand Mvt L 1 1   Pron-/Supinate R 0 0   Pron-/Supinate L 0 0   Toe Tap R 0  0   Toe Tap L 1 1   Leg Agility R 0 0   Leg Agility L 1 0   Arise From Chair 0 0   Gait 1 1   Gait Freezing 0 0   Postural Stability 0 0   Posture 0 0   Global Spont Mvt  0   Postural Tremor RUE  0   Postural Tremor LUE  0   Kinetic Tremor RUE  0   Kinetic Tremor LUE  0   Rest Tremor RUE  0   Rest Tremor LUE  0   Rest Tremor RLE  0   Rest Tremor LLE  0   Rest Tremor Lip/Jaw  0   Rest Tremor Constancy  0   Total Right  0   Total Left  4   Axial Total  4   Total  8       DATA:  PET CT Brain (12/30/2021)  IMPRESSION:  Nonspecific slight patchy parietal lobe hypometabolism. Please see findings for details and other observations.     MRI Brain and C-Spine w/ and w/o contrast (12/23/2021)  IMPRESSION:  No findings for demyelinating lesions in the brain or cervical spinal cord.   Stable pineal cyst.     MRI L-Spine w/ and w/o contrast (12/23/2021)  IMPRESSION:  Stable MRI of the lumbar spine compared to 04/08/2021.   Degenerative changes most marked at the L3-L5 levels as detailed in the findings, resulting in mild lateral recess stenosis bilaterally at L4-L5. No significant spinal canal stenosis or neural foraminal narrowing.     EMG (12/23/2021)  IMPRESSION:  Abnormal study.  There continues to be electrophysiologic evidence of mild chronic inactive left L5 radiculopathy, which has been stable since prior study from 04/08/2021. There is no electrophysiologic evidence of a left cervical radiculopathy, brachial plexopathy, or mononeuropathy of the left upper limb.       ASSESSMENT:  Ellen Jones is a 59yr old R-handed female w/ PMHx pineal gland cyst s/p removal (2022) and cervical radiculopathy who presents for follow-up of Parkinson's Disease. Today the patient is reporting stability of her symptoms, mentioning only that she will occasionally have L foot cramping/dystonia if she is late to take her 4PM pill and then exercises. This is suggestive of an OFF phenomenon. Still, the patient does not feel it is bothersome  enough to want to change medications. One thing we could consider in the future is adding on a PRN dose or else increasing the 4PM dose to 1.5 tab. We otherwise discussed on how to stay active, especially during the winter. We will send her some information on this via [employer] and also provide a referral to PT    PLAN:  - Continue with your current medications   6AM 10AM 4PM   CD/LD (25-100mg) 1 1 1   - In the future, we could consider adding on an as needed dose of carbidopa/levodopa or increasing your 4PM by 1/2 tablets  - Physical Therapy referral  - We will have our movement disorder nurses send you some information about local exercise groups and other parkinson's exercise resources  - Follow-up in 6mo    This patient was seen with Movement Disorder Specialist, Dr Blevins, who agrees with the above plan    Cyndi Angulo MD  Movement Disorder Fellow    Attestation signed by Romi Blevins MD at 11/14/2024 10:56 AM:  Neurology Attending Attestation:   I personally saw this patient with our neurology fellow and agree with the fellow's findings and plan of care as documented in the fellow's note. I personally performed salient aspects of the history and neurological examination. I personally reviewed the vital signs, medications, and labs. I personally viewed the imaging, and agree with the interpretation documented by the fellow.     Ms. Jones is a 59 year old right-handed woman who presents for follow-up of Parkinson's disease.  Overall she continues to do well, although she is noticing occasional cramping of the foot, which sounds like it is wearing off prior to the 4 PM dose.  At this point, is not bothersome enough to change anything, however she is at extremely low dose of the medication so we certainly could increase if needed.  I did provide her a referral for physical therapy and she is interested in learning about exercise activities, so we will work on providing her with a resource for  this.    Romi Blevins MD    [facility]  Department of Neurology     30 minutes spent on the date of the encounter doing chart review, history and exam, documentation and further activities as noted above      Again, thank you for allowing me to participate in the care of your patient.        Sincerely,        Romi Blevins MD

## 2024-11-11 NOTE — NURSING NOTE
"Ellen Jones is a 59 year old female who presents for:  Chief Complaint   Patient presents with    Parkinson     Follow up Parkinson        Initial Vitals:  /73 (BP Location: Right arm, Patient Position: Supine, Cuff Size: Adult Regular)   Pulse 75   LMP 06/03/2015 (Exact Date)   SpO2 99%  Estimated body mass index is 20.37 kg/m  as calculated from the following:    Height as of 5/6/24: 1.6 m (5' 3\").    Weight as of 5/6/24: 52.2 kg (115 lb).. There is no height or weight on file to calculate BSA. BP completed using cuff size: regular    Verona Salinas MA   "

## 2024-11-14 PROBLEM — G20.A2 PARKINSON'S DISEASE WITHOUT DYSKINESIA, WITH FLUCTUATING MANIFESTATIONS (H): Status: ACTIVE | Noted: 2024-11-14

## 2024-12-05 DIAGNOSIS — G20.C PARKINSONISM, UNSPECIFIED PARKINSONISM TYPE (H): ICD-10-CM

## 2024-12-10 NOTE — TELEPHONE ENCOUNTER
Refill Request    Pending Prescriptions:                       Disp   Refills    carbidopa-levodopa (SINEMET)  MG ta*270 ta*3            Sig: TAKE 1 TABLET BY MOUTH THREE TIMES DAILY    3 tabs daily / 270 = 90 days = 3 months x 3 = 6 months?      Last Office Visit: 11/11/24    Next Office Visit: 05/15/24    Prescribing Provider: Felicity    Last Medication Refill Date: 11/06/23    Prior Auth Date (if applicable): NA    Chart Notes:  - In the future, we could consider adding on an as needed dose of carbidopa/levodopa or increasing your 4PM by 1/2 tablets  - Physical Therapy referral  - We will have our movement disorder nurses send you some information about local exercise groups and other parkinson's exercise resources  - Follow-up in 6mo    Action(s):  Routing to provider.

## 2024-12-11 ENCOUNTER — MYC MEDICAL ADVICE (OUTPATIENT)
Dept: NEUROLOGY | Facility: CLINIC | Age: 59
End: 2024-12-11
Payer: COMMERCIAL

## 2024-12-11 DIAGNOSIS — G20.C PARKINSONISM, UNSPECIFIED PARKINSONISM TYPE (H): ICD-10-CM

## 2024-12-12 RX ORDER — CARBIDOPA AND LEVODOPA 25; 100 MG/1; MG/1
1 TABLET ORAL 3 TIMES DAILY
Qty: 270 TABLET | Refills: 3 | OUTPATIENT
Start: 2024-12-12

## 2024-12-12 RX ORDER — CARBIDOPA AND LEVODOPA 25; 100 MG/1; MG/1
1 TABLET ORAL 3 TIMES DAILY
Qty: 270 TABLET | Refills: 3 | Status: SHIPPED | OUTPATIENT
Start: 2024-12-12

## 2024-12-12 NOTE — TELEPHONE ENCOUNTER
Per 11/11/24 OV note:  Continue with your current medications    6AM 10AM 4PM   CD/LD (25-100mg) 1 1 1     Last Written Prescription Date:  11/6/23  Last Fill Quantity: 270,  # refills: 3   Last office visit: 11/11/2024   Future Office Visit:  5/12/25    Darlene RN, BSN  [facility]

## 2024-12-12 NOTE — TELEPHONE ENCOUNTER
Please see 12/11/24 mychart encounter.     RN filled per protocol.     Will close this encounter.     TAB Colon, BSN  [facility]

## 2024-12-12 NOTE — TELEPHONE ENCOUNTER
Prescription approved per [facility] Refill Protocol.    Darlene RN, BSN  [facility]

## 2025-05-10 ENCOUNTER — HEALTH MAINTENANCE LETTER (OUTPATIENT)
Age: 60
End: 2025-05-10

## 2025-05-10 NOTE — PROGRESS NOTES
"Department of Neurology  Movement Disorders Division  Follow-up Patient Visit    Patient: Ellen Jones  MRN: [id number]  : 1965  Date of Visit: 2025  PCP: Pat Gandhi  Referring Provider: Romi Blevins MD  [address] [place] [id number]    CC: Parkinson's Disease    HPI:  Ellen Jones is a 59yr old R-handed female w/ PMHx pineal gland cyst s/p removal () and cervical radiculopathy who presents for follow-up of Parkinson's Disease.     Symptom onset was in  when she began having numbness/tingling in her L foot. She underwent an EMG which noted only a chronic, inactive left L5 radiculopathy. She was then seen at [place] who \"ruled out radiculopathy\". In 2021, her physicians mentioned parkinsonisms. 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 Her symptoms continued to progress and she was first seen in this clinic in 2023 where exam revealed LUE/LLE rigidity, bradykinesia, and minor L hand action tremor. She also mentioned L toe cramping and cognitive slowing. 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 However her improvement with CD/LD would suggest the former     INTERVAL EVENTS:  The patient was last seen on 2024, at which time she was reporting good control of her PD symptoms on CD/LD (25-100mg) 1 tab TID save that around 4PM she will sometimes have increased foot cramping. However, this did not bother her enough to warrant changing medication though a referral to PT was given    Today, the patient reports that she continues to do well on CD/LD (25-100mg) 1 tab TID. She is no longer noticing any wearing off during the day, but may sometimes wake up in the middle of the night and feel a bit tight but not enough to change medication. She also denies dyskinesia    Her balance is going well and she denies any falls. She denies any speaking/swallowing " issues, constipation, lightheadedness, or sleep issues    She has some urinary urgency She also some general fatigue. She may sometimes take a nap during the weekends which is helpful but it's more difficult during the week as she is working. She also tries to stay active, especially now that the weather has improved. She will try to go on daily walks. She wonders if the fatigue could be a bit related to the medication but she wouldn't want to change things    The patient does not currently drive as she feels she has difficulty keeping the car inside the lanes at high speeds. 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[employer] and has no trouble with this     MEDICATIONS:  Pertinent Movement Medications   6AM 10AM 4PM   CD/LD   (25-100mg) 1 1 1         Last taken today around 6:30AM    PHYSICAL EXAM:  /65 (BP Location: Left arm, Patient Position: Sitting, Cuff Size: Adult Regular)   Pulse 68   LMP 06/03/2015 (Exact Date)   SpO2 99%     Decreased arm movement bilaterally with walking      11/11/2024     8:00 AM 5/12/2025     8:00 AM   UPDRS Motor Scale   Time: 08:37 08:45   Medication On On   R Brain DBS: None None   L Brain DBS: None None   Dyskinesia (LID) No No   Did LID interfere No    Speech 0 1   Facial Expression 1 1   Rigidity Neck 0 1   Rigidity RUE 1 1   Rigidity LUE 2 1   Rigidity RLE 0 0   Rigidity LLE 0 0   Finger Taps R 0 0   Finger Taps L 1 1   Hand Mvt R 0 0   Hand Mvt L 1 1   Pron-/Supinate R 0 0   Pron-/Supinate L 1 0   Toe Tap R 0 1   Toe Tap L 0 2   Leg Agility R 0 1   Leg Agility L 1 2   Arise From Chair 0 0   Gait 1 0   Gait Freezing 0 0   Postural Stability 0 0   Posture 0 0   Global Spont Mvt 1 1   Postural Tremor RUE 0 0   Postural Tremor LUE 0 0   Kinetic Tremor RUE 0 0   Kinetic Tremor LUE 0 1   Rest Tremor RUE 0 0   Rest Tremor LUE 0 0   Rest Tremor RLE 0 0   Rest Tremor LLE 0 0   Rest Tremor Lip/Jaw 0 0   Rest Tremor Constancy 0 0   Total Right 1 3   Total Left 6 8  "  Axial Total 3 4   Total 10 15     DATA:  PET CT Brain (12/30/2021)  IMPRESSION:  Nonspecific slight patchy parietal lobe hypometabolism. Please see findings for details and other observations.      MRI Brain and C-Spine w/ and w/o contrast (12/23/2021)  IMPRESSION:  No findings for demyelinating lesions in the brain or cervical spinal cord.   Stable pineal cyst.      MRI L-Spine w/ and w/o contrast (12/23/2021)  IMPRESSION:  Stable MRI of the lumbar spine compared to 04/08/2021.   Degenerative changes most marked at the L3-L5 levels as detailed in the findings, resulting in mild lateral recess stenosis bilaterally at L4-L5. No significant spinal canal stenosis or neural foraminal narrowing.      EMG (12/23/2021)  IMPRESSION:  Abnormal study.  There continues to be electrophysiologic evidence of mild chronic inactive left L5 radiculopathy, which has been stable since prior study from 04/08/2021. There is no electrophysiologic evidence of a left cervical radiculopathy, brachial plexopathy, or mononeuropathy of the left upper limb.       ASSESSMENT:  Ellen Jones is a 59yr old R-handed female w/ PMHx pineal gland cyst s/p removal (2022) and cervical radiculopathy who presents for follow-up of Parkinson's Disease. Today, the patient continues to report that she is doing quite well on CD/LD (25-100mg) 1 tab TID. She may have some minor wearing off - manifesting as \"stiffness\" - in the middle of the night but not enough that she'd want to change medication. As such, we will maintain her on the same regimen for now    PLAN:  - Continue on Carbidopa/Levodopa (25-100mg) 1 tab three times per day  - Continue to exercise and keep active as much as you are able  - If your urinary frequency gets worse and is keeping you up at night or if the wearing off/stiffness gets worse, let us know as poor sleep will make your other symptoms worse  - Follow-up in 6mo    This patient was seen with Movement Disorder Specialist, Dr" Felicity, who agrees with the above plan    Cyndi Angulo MD  Movement Disorder Fellow

## 2025-05-12 ENCOUNTER — OFFICE VISIT (OUTPATIENT)
Dept: NEUROLOGY | Facility: CLINIC | Age: 60
End: 2025-05-12
Payer: COMMERCIAL

## 2025-05-12 VITALS — HEART RATE: 68 BPM | DIASTOLIC BLOOD PRESSURE: 65 MMHG | SYSTOLIC BLOOD PRESSURE: 101 MMHG | OXYGEN SATURATION: 99 %

## 2025-05-12 DIAGNOSIS — G20.A2 PARKINSON'S DISEASE WITHOUT DYSKINESIA, WITH FLUCTUATING MANIFESTATIONS (H): Primary | ICD-10-CM

## 2025-05-12 ASSESSMENT — UNIFIED PARKINSONS DISEASE RATING SCALE (UPDRS)
FACIAL_EXPRESSION: (1) SLIGHT: MINIMAL MASKED FACIES MANIFESTED ONLY BY DECREASED FREQUENCY OF BLINKING.
TOETAPPING_RIGHT: (1) SLIGHT: ANY OF THE FOLLOWING: A) THE REGULAR RHYTHM IS BROKEN WITH ONE WITH ONE OR TWO INTERRUPTIONS OR HESITATIONS OF THE MOVEMENT  B) SLIGHT SLOWING  C) THE AMPLITUDE DECREMENTS NEAR THE END OF THE 10 MOVEMENTS.
SPONTANEITY_OF_MOVEMENT: (1) SLIGHT: SLIGHT GLOBAL SLOWNESS AND POVERTY OF SPONTANEOUS MOVEMENTS.
POSTURE: (0) NORMAL: NO PROBLEMS.
PRONATION_SUPINATION_LEFT: (0) NORMAL: NO PROBLEMS.
SPEECH: (1) SLIGHT: LOSS OF MODULATION, DICTION OR VOLUME, BUT STILL ALL WORDS EASY TO UNDERSTAND.
TOETAPPING_LEFT: (2) MILD: ANY OF THE FOLLOWING: A) 3 TO 5 INTERRUPTIONS DURING TAPPING  B) MILD SLOWING  C) THE AMPLITUDE DECREMENTS MIDWAY IN THE 10-MOVEMENT SEQUENCE.
PRONATION_SUPINATION_RIGHT: (0) NORMAL: NO PROBLEMS.
AMPLITUDE_RLE: (0) NORMAL: NO TREMOR.
LEG_AGILITY_RIGHT: (1) SLIGHT: ANY OF THE FOLLOWING: A) THE REGULAR RHYTHM IS BROKEN WITH ONE WITH ONE OR TWO INTERRUPTIONS OR HESITATIONS OF THE MOVEMENT  B) SLIGHT SLOWING  C) THE AMPLITUDE DECREMENTS NEAR THE END OF THE 10 MOVEMENTS.
CONSTANCY_TREMOR_ATREST: (0) NORMAL: NO TREMOR.
AXIAL_SCORE: 4
HANDMOVEMENTS_RIGHT: (0) NORMAL: NO PROBLEMS.
PARKINSONS_MEDS: ON
FINGER_TAPPING_RIGHT: (0) NORMAL: NO PROBLEMS.
TOTAL_SCORE_LEFT: 8
TOTAL_SCORE: 15
RIGIDITY_RLE: (0) NORMAL: NO RIGIDITY.
RIGIDITY_LLE: (0) NORMAL: NO RIGIDITY.
RIGIDITY_NECK: (1) SLIGHT: RIGIDITY ONLY DETECTED WITH ACTIVATION MANEUVER.
AMPLITUDE_LIP_JAW: (0) NORMAL: NO TREMOR.
POSTURAL_STABILITY: (0) NORMAL: NO PROBLEMS. RECOVERS WITH ONE OR TWO STEPS.
AMPLITUDE_RUE: (0) NORMAL: NO TREMOR.
AMPLITUDE_LLE: (0) NORMAL: NO TREMOR.
FREEZING_GAIT: (0) NORMAL: NO FREEZING.
DYSKINESIAS_PRESENT: NO
HANDMOVEMENTS_LEFT: (1) SLIGHT: ANY OF THE FOLLOWING: A) THE REGULAR RHYTHM IS BROKEN WITH ONE WITH ONE OR TWO INTERRUPTIONS OR HESITATIONS OF THE MOVEMENT  B) SLIGHT SLOWING  C) THE AMPLITUDE DECREMENTS NEAR THE END OF THE 10 MOVEMENTS.
AMPLITUDE_LUE: (0) NORMAL: NO TREMOR.
ARISING_CHAIR: (0) NORMAL: NO PROBLEMS. ABLE TO ARISE QUICKLY WITHOUT HESITATION.
LEG_AGILITY_LEFT: (2) MILD: ANY OF THE FOLLOWING: A) 3 TO 5 INTERRUPTIONS DURING TAPPING  B) MILD SLOWING  C) THE AMPLITUDE DECREMENTS MIDWAY IN THE 10-MOVEMENT SEQUENCE.
GAIT: (0) NORMAL: NO PROBLEMS.
RIGIDITY_RUE: (1) SLIGHT: RIGIDITY ONLY DETECTED WITH ACTIVATION MANEUVER.
TOTAL_SCORE: 3
RIGIDITY_LUE: (1) SLIGHT: RIGIDITY ONLY DETECTED WITH ACTIVATION MANEUVER.
FINGER_TAPPING_LEFT: (1) SLIGHT: ANY OF THE FOLLOWING: A) THE REGULAR RHYTHM IS BROKEN WITH ONE WITH ONE OR TWO INTERRUPTIONS OR HESITATIONS OF THE MOVEMENT  B) SLIGHT SLOWING  C) THE AMPLITUDE DECREMENTS NEAR THE END OF THE 10 MOVEMENTS.

## 2025-05-12 NOTE — NURSING NOTE
"Ellen Jones is a 59 year old female who presents for:  Chief Complaint   Patient presents with    Parkinson     6 month follow up Parkinson  \"Symptoms have been about the same\"   \"A little bit of pain in my R foot\"           Initial Vitals:  /65 (BP Location: Left arm, Patient Position: Sitting, Cuff Size: Adult Regular)   Pulse 68   LMP 06/03/2015 (Exact Date)   SpO2 99%  Estimated body mass index is 20.37 kg/m  as calculated from the following:    Height as of 5/6/24: 5' 3\" (1.6 m).    Weight as of 5/6/24: 115 lb (52.2 kg).. There is no height or weight on file to calculate BSA. BP completed using cuff size: regular    Jorge Matthew    "

## 2025-05-12 NOTE — PATIENT INSTRUCTIONS
- Continue on Carbidopa/Levodopa (25-100mg) 1 tab three times per day  - Continue to exercise and keep active as much as you are able  - If your urinary frequency gets worse and is keeping you up at night or if the wearing off/stiffness gets worse, let us know as poor sleep will make your other symptoms worse  - Follow-up in 6mo

## 2025-05-12 NOTE — LETTER
"2025      Ellen Jones  [address] [facility] [address]      Dear Colleague,    Thank you for referring your patient, Ellen Jones, to the [facility]OLOG[facility]. Please see a copy of my visit note below.    Department of Neurology  Movement Disorders Division  Follow-up Patient Visit    Patient: Ellen Jones  MRN: [id number]  : 1965  Date of Visit: 2025  PCP: Pat Gandhi  Referring Provider: Romi Blevins MD  [address] [place] [id number]    CC: Parkinson's Disease    HPI:  Ellen Jones is a 59yr old R-handed female w/ PMHx pineal gland cyst s/p removal () and cervical radiculopathy who presents for follow-up of Parkinson's Disease.     Symptom onset was in  when she began having numbness/tingling in her L foot. She underwent an EMG which noted only a chronic, inactive left L5 radiculopathy. She was then seen at [facility] who \"ruled out radiculopathy\". In 2021, her physicians mentioned parkinsonisms. 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 Her symptoms continued to progress and she was first seen in this clinic in 2023 where exam revealed LUE/LLE rigidity, bradykinesia, and minor L hand action tremor. She also mentioned L toe cramping and cognitive slowing. 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 However her improvement with CD/LD would suggest the former     INTERVAL EVENTS:  The patient was last seen on 2024, at which time she was reporting good control of her PD symptoms on CD/LD (25-100mg) 1 tab TID save that around 4PM she will sometimes have increased foot cramping. However, this did not bother her enough to warrant changing medication though a referral to PT was given    Today, the patient reports that she continues to do well on CD/LD (25-100mg) 1 tab TID. She is no longer noticing any " wearing off during the day, but may sometimes wake up in the middle of the night and feel a bit tight but not enough to change medication. She also denies dyskinesia    Her balance is going well and she denies any falls. She denies any speaking/swallowing issues, constipation, lightheadedness, or sleep issues    She has some urinary urgency She also some general fatigue. She may sometimes take a nap during the weekends which is helpful but it's more difficult during the week as she is working. She also tries to stay active, especially now that the weather has improved. She will try to go on daily walks. She wonders if the fatigue could be a bit related to the medication but she wouldn't want to change things    The patient does not currently drive as she feels she has difficulty keeping the car inside the lanes at high speeds. 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[employer] and has no trouble with this     MEDICATIONS:  Pertinent Movement Medications   6AM 10AM 4PM   CD/LD   (25-100mg) 1 1 1         Last taken today around 6:30AM    PHYSICAL EXAM:  /65 (BP Location: Left arm, Patient Position: Sitting, Cuff Size: Adult Regular)   Pulse 68   LMP 06/03/2015 (Exact Date)   SpO2 99%     Decreased arm movement bilaterally with walking      11/11/2024     8:00 AM 5/12/2025     8:00 AM   UPDRS Motor Scale   Time: 08:37 08:45   Medication On On   R Brain DBS: None None   L Brain DBS: None None   Dyskinesia (LID) No No   Did LID interfere No    Speech 0 1   Facial Expression 1 1   Rigidity Neck 0 1   Rigidity RUE 1 1   Rigidity LUE 2 1   Rigidity RLE 0 0   Rigidity LLE 0 0   Finger Taps R 0 0   Finger Taps L 1 1   Hand Mvt R 0 0   Hand Mvt L 1 1   Pron-/Supinate R 0 0   Pron-/Supinate L 1 0   Toe Tap R 0 1   Toe Tap L 0 2   Leg Agility R 0 1   Leg Agility L 1 2   Arise From Chair 0 0   Gait 1 0   Gait Freezing 0 0   Postural Stability 0 0   Posture 0 0   Global Spont Mvt 1 1   Postural Tremor RUE 0  "0   Postural Tremor LUE 0 0   Kinetic Tremor RUE 0 0   Kinetic Tremor LUE 0 1   Rest Tremor RUE 0 0   Rest Tremor LUE 0 0   Rest Tremor RLE 0 0   Rest Tremor LLE 0 0   Rest Tremor Lip/Jaw 0 0   Rest Tremor Constancy 0 0   Total Right 1 3   Total Left 6 8   Axial Total 3 4   Total 10 15     DATA:  PET CT Brain (12/30/2021)  IMPRESSION:  Nonspecific slight patchy parietal lobe hypometabolism. Please see findings for details and other observations.      MRI Brain and C-Spine w/ and w/o contrast (12/23/2021)  IMPRESSION:  No findings for demyelinating lesions in the brain or cervical spinal cord.   Stable pineal cyst.      MRI L-Spine w/ and w/o contrast (12/23/2021)  IMPRESSION:  Stable MRI of the lumbar spine compared to 04/08/2021.   Degenerative changes most marked at the L3-L5 levels as detailed in the findings, resulting in mild lateral recess stenosis bilaterally at L4-L5. No significant spinal canal stenosis or neural foraminal narrowing.      EMG (12/23/2021)  IMPRESSION:  Abnormal study.  There continues to be electrophysiologic evidence of mild chronic inactive left L5 radiculopathy, which has been stable since prior study from 04/08/2021. There is no electrophysiologic evidence of a left cervical radiculopathy, brachial plexopathy, or mononeuropathy of the left upper limb.       ASSESSMENT:  Ellen Jones is a 59yr old R-handed female w/ PMHx pineal gland cyst s/p removal (2022) and cervical radiculopathy who presents for follow-up of Parkinson's Disease. Today, the patient continues to report that she is doing quite well on CD/LD (25-100mg) 1 tab TID. She may have some minor wearing off - manifesting as \"stiffness\" - in the middle of the night but not enough that she'd want to change medication. As such, we will maintain her on the same regimen for now    PLAN:  - Continue on Carbidopa/Levodopa (25-100mg) 1 tab three times per day  - Continue to exercise and keep active as much as you are able  - If your " urinary frequency gets worse and is keeping you up at night or if the wearing off/stiffness gets worse, let us know as poor sleep will make your other symptoms worse  - Follow-up in 6mo    This patient was seen with Movement Disorder Specialist, Dr Blevins, who agrees with the above plan    Cyndi Angulo MD  Movement Disorder Fellow    Attestation signed by Romi Blevins MD at 5/12/2025  9:49 AM:  Neurology Attending Attestation:   I personally saw this patient with our neurology fellow and agree with the fellow's findings and plan of care as documented in the fellow's note. I personally performed salient aspects of the history and neurological examination. I personally reviewed the vital signs, medications, and labs.    Ms. Jones is a 59 year old right-handed woman who presents for follow-up of Parkinson's disease.  She is overall doing well, perhaps some wearing off overnight, but not to the extent/regular enough that she wants to start a bedtime dose of carbidopa/levodopa.  Today we discussed that given her good response to the medication and lack of aggressive progression or other red flag symptoms, I suspect this is not atypical parkinsonism.  We will continue her current dose of carbidopa/levodopa and I encouraged her to continue to remain active.  No changes were made today.    Romi Blevins MD    [facility]  Department of Neurology     30 minutes spent on the date of the encounter doing chart review, history and exam, documentation and further activities as noted above    Again, thank you for allowing me to participate in the care of your patient.        Sincerely,        Romi Blevins MD    Electronically signed